# Patient Record
Sex: MALE | Race: WHITE | Employment: FULL TIME | URBAN - NONMETROPOLITAN AREA
[De-identification: names, ages, dates, MRNs, and addresses within clinical notes are randomized per-mention and may not be internally consistent; named-entity substitution may affect disease eponyms.]

---

## 2017-01-17 DIAGNOSIS — E34.9 TESTOSTERONE DEFICIENCY: ICD-10-CM

## 2017-01-17 DIAGNOSIS — N18.2 CHRONIC KIDNEY DISEASE, STAGE II (MILD): ICD-10-CM

## 2017-01-17 DIAGNOSIS — E78.5 HYPERLIPOPROTEINEMIA: ICD-10-CM

## 2017-01-17 DIAGNOSIS — E78.2 MIXED HYPERLIPIDEMIA: ICD-10-CM

## 2017-01-17 LAB
ALBUMIN SERPL-MCNC: 4.3 G/DL (ref 3.5–5.2)
ALP BLD-CCNC: 61 U/L (ref 40–130)
ALT SERPL-CCNC: 29 U/L (ref 5–41)
AST SERPL-CCNC: 24 U/L (ref 5–40)
BILIRUB SERPL-MCNC: 0.4 MG/DL (ref 0.2–1.2)
BILIRUBIN DIRECT: 0.1 MG/DL (ref 0–0.3)
BILIRUBIN, INDIRECT: 0.3 MG/DL (ref 0.1–1)
T4 FREE: 1.1 NG/ML (ref 0.9–1.7)
TOTAL PROTEIN: 7.3 G/DL (ref 6.6–8.7)
TSH SERPL DL<=0.05 MIU/L-ACNC: 2.65 UIU/ML (ref 0.27–4.2)

## 2017-01-19 LAB
PROSTATE SPECIFIC ANTIGEN FREE: 0.3 NG/ML
PROSTATE SPECIFIC ANTIGEN PERCENT FREE: 43 %
PROSTATE SPECIFIC ANTIGEN: 0.7 NG/ML (ref 0–4)
SEX HORMONE BINDING GLOBULIN: 27 NMOL/L (ref 11–80)
TESTOSTERONE FREE PERCENT: 2 % (ref 1.6–2.9)
TESTOSTERONE FREE, CALC: 59 PG/ML (ref 47–244)
TESTOSTERONE TOTAL-MALE: 298 NG/DL (ref 300–890)

## 2017-01-20 ENCOUNTER — TELEPHONE (OUTPATIENT)
Dept: FAMILY MEDICINE CLINIC | Age: 57
End: 2017-01-20

## 2017-01-21 ENCOUNTER — TELEPHONE (OUTPATIENT)
Dept: FAMILY MEDICINE CLINIC | Age: 57
End: 2017-01-21

## 2017-01-22 LAB
CHOLESTEROL, TOTAL: 239 MG/DL (ref 100–199)
HDL PARTICLE NO, NMR: 35 UMOL/L
HDL SIZE: 8.3 NM
HDLC SERPL-MCNC: 41 MG/DL
LARGE HDL PARTICLE, NMR: 1.7 UMOL/L
LARGE VLDL PARTICLE, NMR: 7.6 NMOL/L
LDL CHOLESTEROL: 162 MG/DL (ref 0–99)
LDL PARTICLE NUMBER, NMR: 2229 NMOL/L
LDL PARTICLE SIZE: 20.6 NM
LDL SIZE: 20.6 NM
LP INSULIN RESIST SCORE: 81
SMALL LDL PARTICLE, NMR: 1184 NMOL/L
SMALL LDL-P: 1184 NMOL/L
TRIGL SERPL-MCNC: 179 MG/DL (ref 0–149)
VLDL SIZE: 52.9 NM

## 2017-01-30 ENCOUNTER — OFFICE VISIT (OUTPATIENT)
Dept: FAMILY MEDICINE CLINIC | Age: 57
End: 2017-01-30
Payer: COMMERCIAL

## 2017-01-30 VITALS
RESPIRATION RATE: 20 BRPM | OXYGEN SATURATION: 97 % | HEIGHT: 71 IN | HEART RATE: 90 BPM | TEMPERATURE: 99.1 F | BODY MASS INDEX: 36.82 KG/M2 | SYSTOLIC BLOOD PRESSURE: 124 MMHG | DIASTOLIC BLOOD PRESSURE: 82 MMHG | WEIGHT: 263 LBS

## 2017-01-30 DIAGNOSIS — K21.9 GASTROESOPHAGEAL REFLUX DISEASE WITHOUT ESOPHAGITIS: ICD-10-CM

## 2017-01-30 DIAGNOSIS — E34.9 TESTOSTERONE DEFICIENCY: ICD-10-CM

## 2017-01-30 DIAGNOSIS — E78.5 HYPERLIPOPROTEINEMIA: ICD-10-CM

## 2017-01-30 DIAGNOSIS — M19.90 ARTHRITIS: ICD-10-CM

## 2017-01-30 DIAGNOSIS — N18.2 CHRONIC KIDNEY DISEASE, STAGE II (MILD): ICD-10-CM

## 2017-01-30 DIAGNOSIS — E78.2 MIXED HYPERLIPIDEMIA: ICD-10-CM

## 2017-01-30 DIAGNOSIS — J02.9 PHARYNGITIS, UNSPECIFIED ETIOLOGY: ICD-10-CM

## 2017-01-30 PROCEDURE — 99214 OFFICE O/P EST MOD 30 MIN: CPT | Performed by: FAMILY MEDICINE

## 2017-01-30 RX ORDER — CIPROFLOXACIN 500 MG/1
500 TABLET, FILM COATED ORAL 2 TIMES DAILY
Qty: 20 TABLET | Refills: 0 | Status: SHIPPED | OUTPATIENT
Start: 2017-01-30 | End: 2017-02-09

## 2017-01-30 RX ORDER — LOVASTATIN 20 MG/1
20 TABLET ORAL DAILY
Qty: 90 TABLET | Refills: 3 | Status: SHIPPED | OUTPATIENT
Start: 2017-01-30 | End: 2017-05-04 | Stop reason: SDUPTHER

## 2017-01-30 ASSESSMENT — ENCOUNTER SYMPTOMS
BLOOD IN STOOL: 0
CHEST TIGHTNESS: 0
EYES NEGATIVE: 1
ALLERGIC/IMMUNOLOGIC NEGATIVE: 1
WHEEZING: 0
VOMITING: 0
SHORTNESS OF BREATH: 1
COUGH: 0
NAUSEA: 0
CONSTIPATION: 0
DIARRHEA: 0

## 2017-01-30 ASSESSMENT — PATIENT HEALTH QUESTIONNAIRE - PHQ9
SUM OF ALL RESPONSES TO PHQ QUESTIONS 1-9: 0
SUM OF ALL RESPONSES TO PHQ9 QUESTIONS 1 & 2: 0
2. FEELING DOWN, DEPRESSED OR HOPELESS: 0
1. LITTLE INTEREST OR PLEASURE IN DOING THINGS: 0

## 2017-02-24 DIAGNOSIS — J01.11 ACUTE RECURRENT FRONTAL SINUSITIS: ICD-10-CM

## 2017-02-24 RX ORDER — MONTELUKAST SODIUM 10 MG/1
10 TABLET ORAL DAILY
Qty: 90 TABLET | Refills: 3 | Status: SHIPPED | OUTPATIENT
Start: 2017-02-24 | End: 2017-09-29

## 2017-04-28 DIAGNOSIS — E78.2 MIXED HYPERLIPIDEMIA: ICD-10-CM

## 2017-04-28 LAB
ALBUMIN SERPL-MCNC: 4.3 G/DL (ref 3.5–5.2)
ALP BLD-CCNC: 64 U/L (ref 40–130)
ALT SERPL-CCNC: 28 U/L (ref 5–41)
AST SERPL-CCNC: 22 U/L (ref 5–40)
BILIRUB SERPL-MCNC: 0.6 MG/DL (ref 0.2–1.2)
BILIRUBIN DIRECT: 0.1 MG/DL (ref 0–0.3)
BILIRUBIN, INDIRECT: 0.5 MG/DL (ref 0.1–1)
TOTAL PROTEIN: 7.1 G/DL (ref 6.6–8.7)

## 2017-05-03 LAB
CHOLESTEROL, TOTAL: 191 MG/DL (ref 100–199)
HDL PARTICLE NO, NMR: 28.7 UMOL/L
HDL SIZE: 8.3 NM
HDLC SERPL-MCNC: 37 MG/DL
LARGE HDL PARTICLE, NMR: <1.3 UMOL/L
LARGE VLDL PARTICLE, NMR: 4.2 NMOL/L
LDL CHOLESTEROL: 121 MG/DL (ref 0–99)
LDL PARTICLE NUMBER, NMR: 1672 NMOL/L
LDL PARTICLE SIZE: 20.3 NM
LDL SIZE: 20.3 NM
LP INSULIN RESIST SCORE: 75
SMALL LDL PARTICLE, NMR: 826 NMOL/L
SMALL LDL-P: 826 NMOL/L
TRIGL SERPL-MCNC: 163 MG/DL (ref 0–149)
VLDL SIZE: 48.8 NM

## 2017-05-04 ENCOUNTER — TELEPHONE (OUTPATIENT)
Dept: FAMILY MEDICINE CLINIC | Age: 57
End: 2017-05-04

## 2017-05-04 DIAGNOSIS — E78.2 MIXED HYPERLIPIDEMIA: ICD-10-CM

## 2017-05-04 DIAGNOSIS — E78.5 HYPERLIPOPROTEINEMIA: ICD-10-CM

## 2017-05-04 RX ORDER — LOVASTATIN 20 MG/1
20 TABLET ORAL DAILY
Qty: 90 TABLET | Refills: 3 | Status: SHIPPED | OUTPATIENT
Start: 2017-05-04 | End: 2017-09-21 | Stop reason: SDUPTHER

## 2017-05-12 ENCOUNTER — OFFICE VISIT (OUTPATIENT)
Dept: FAMILY MEDICINE CLINIC | Age: 57
End: 2017-05-12
Payer: COMMERCIAL

## 2017-05-12 VITALS
RESPIRATION RATE: 16 BRPM | OXYGEN SATURATION: 98 % | TEMPERATURE: 98.3 F | DIASTOLIC BLOOD PRESSURE: 88 MMHG | BODY MASS INDEX: 36.96 KG/M2 | HEART RATE: 72 BPM | SYSTOLIC BLOOD PRESSURE: 130 MMHG | WEIGHT: 265 LBS

## 2017-05-12 DIAGNOSIS — H66.001 ACUTE SUPPURATIVE OTITIS MEDIA OF RIGHT EAR WITHOUT SPONTANEOUS RUPTURE OF TYMPANIC MEMBRANE, RECURRENCE NOT SPECIFIED: Primary | ICD-10-CM

## 2017-05-12 PROCEDURE — 99213 OFFICE O/P EST LOW 20 MIN: CPT | Performed by: NURSE PRACTITIONER

## 2017-05-12 RX ORDER — CEFDINIR 300 MG/1
300 CAPSULE ORAL 2 TIMES DAILY
Qty: 20 CAPSULE | Refills: 0 | Status: SHIPPED | OUTPATIENT
Start: 2017-05-12 | End: 2017-05-22

## 2017-05-12 ASSESSMENT — ENCOUNTER SYMPTOMS
SORE THROAT: 0
COUGH: 0

## 2017-09-19 ENCOUNTER — TELEPHONE (OUTPATIENT)
Dept: CARDIOLOGY | Age: 57
End: 2017-09-19

## 2017-09-21 DIAGNOSIS — E78.2 MIXED HYPERLIPIDEMIA: ICD-10-CM

## 2017-09-21 DIAGNOSIS — E78.5 HYPERLIPOPROTEINEMIA: ICD-10-CM

## 2017-09-21 RX ORDER — LOVASTATIN 20 MG/1
20 TABLET ORAL DAILY
Qty: 90 TABLET | Refills: 3 | Status: SHIPPED | OUTPATIENT
Start: 2017-09-21 | End: 2017-09-29

## 2017-09-29 ENCOUNTER — HOSPITAL ENCOUNTER (OUTPATIENT)
Dept: PREADMISSION TESTING | Age: 57
Discharge: HOME | End: 2017-09-29
Payer: COMMERCIAL

## 2017-09-29 VITALS — HEIGHT: 71 IN | WEIGHT: 266 LBS | BODY MASS INDEX: 37.24 KG/M2

## 2017-09-29 LAB
ANION GAP SERPL CALCULATED.3IONS-SCNC: 10 MMOL/L (ref 7–19)
BASOPHILS ABSOLUTE: 0.1 K/UL (ref 0–0.2)
BASOPHILS RELATIVE PERCENT: 0.8 % (ref 0–1)
BUN BLDV-MCNC: 18 MG/DL (ref 6–20)
CALCIUM SERPL-MCNC: 9.3 MG/DL (ref 8.6–10)
CHLORIDE BLD-SCNC: 102 MMOL/L (ref 98–111)
CO2: 26 MMOL/L (ref 22–29)
CREAT SERPL-MCNC: 0.9 MG/DL (ref 0.5–1.2)
EOSINOPHILS ABSOLUTE: 0.2 K/UL (ref 0–0.6)
EOSINOPHILS RELATIVE PERCENT: 3.1 % (ref 0–5)
GFR NON-AFRICAN AMERICAN: >60
GLUCOSE BLD-MCNC: 95 MG/DL (ref 74–109)
HCT VFR BLD CALC: 47.9 % (ref 42–52)
HEMOGLOBIN: 15.4 G/DL (ref 14–18)
LYMPHOCYTES ABSOLUTE: 3.2 K/UL (ref 1.1–4.5)
LYMPHOCYTES RELATIVE PERCENT: 44.6 % (ref 20–40)
MCH RBC QN AUTO: 29 PG (ref 27–31)
MCHC RBC AUTO-ENTMCNC: 32.2 G/DL (ref 33–37)
MCV RBC AUTO: 90.2 FL (ref 80–94)
MONOCYTES ABSOLUTE: 0.7 K/UL (ref 0–0.9)
MONOCYTES RELATIVE PERCENT: 9.6 % (ref 0–10)
NEUTROPHILS ABSOLUTE: 3 K/UL (ref 1.5–7.5)
NEUTROPHILS RELATIVE PERCENT: 41.8 % (ref 50–65)
PDW BLD-RTO: 13.2 % (ref 11.5–14.5)
PLATELET # BLD: 222 K/UL (ref 130–400)
PMV BLD AUTO: 11.9 FL (ref 9.4–12.4)
POTASSIUM SERPL-SCNC: 5 MMOL/L (ref 3.5–5)
RBC # BLD: 5.31 M/UL (ref 4.7–6.1)
SODIUM BLD-SCNC: 138 MMOL/L (ref 136–145)
WBC # BLD: 7.2 K/UL (ref 4.8–10.8)

## 2017-09-29 PROCEDURE — 93005 ELECTROCARDIOGRAM TRACING: CPT

## 2017-09-29 PROCEDURE — 80048 BASIC METABOLIC PNL TOTAL CA: CPT

## 2017-09-29 PROCEDURE — 85025 COMPLETE CBC W/AUTO DIFF WBC: CPT

## 2017-09-29 RX ORDER — LOVASTATIN 20 MG/1
20 TABLET ORAL NIGHTLY
COMMUNITY
End: 2019-01-26 | Stop reason: ALTCHOICE

## 2017-09-30 LAB
EKG P AXIS: 41 DEGREES
EKG P-R INTERVAL: 194 MS
EKG Q-T INTERVAL: 370 MS
EKG QRS DURATION: 92 MS
EKG QTC CALCULATION (BAZETT): 387 MS
EKG T AXIS: 39 DEGREES

## 2017-10-05 ENCOUNTER — OFFICE VISIT (OUTPATIENT)
Dept: CARDIOLOGY | Age: 57
End: 2017-10-05
Payer: COMMERCIAL

## 2017-10-05 VITALS
WEIGHT: 266 LBS | HEART RATE: 76 BPM | DIASTOLIC BLOOD PRESSURE: 80 MMHG | SYSTOLIC BLOOD PRESSURE: 130 MMHG | HEIGHT: 71 IN | BODY MASS INDEX: 37.24 KG/M2

## 2017-10-05 DIAGNOSIS — E78.2 MIXED HYPERLIPIDEMIA: Primary | ICD-10-CM

## 2017-10-05 DIAGNOSIS — Z82.49 FAMILY HISTORY OF EARLY CAD: ICD-10-CM

## 2017-10-05 DIAGNOSIS — Z01.810 PREOPERATIVE CARDIOVASCULAR EXAMINATION: ICD-10-CM

## 2017-10-05 PROCEDURE — 99203 OFFICE O/P NEW LOW 30 MIN: CPT | Performed by: CLINICAL NURSE SPECIALIST

## 2017-10-05 RX ORDER — CELECOXIB 200 MG/1
200 CAPSULE ORAL PRN
COMMUNITY
End: 2018-11-26 | Stop reason: SDUPTHER

## 2017-10-05 NOTE — PROGRESS NOTES
shortness of breath. Cardiovascular  no exertional chest pain, orthopnea or PND. No sensation of arrhythmia or slow heart rate. No claudication or leg edema. Gastrointestinal  no abdominal swelling or pain. No blood in stool. No severe constipation, diarrhea, nausea, or vomiting. Genitourinary  no difficulty urinating, dysuria, frequency, or urgency. No flank pain or hematuria. Musculoskeletal  no back pain, gait disturbance, or myalgia. Has knee pain  Skin  no color change or rash. No pallor. No new surgical incision. Neurologic  no speech difficulty, facial asymmetry or lateralizing weakness. No seizures, presyncope, syncope, or significant dizziness. Hematologic  no easy bruising or excessive bleeding. Psychiatric  no severe anxiety or insomnia. No confusion. All other review of systems are negative. Objective  Vital Signs - /80  Pulse 76  Ht 5' 11\" (1.803 m)  Wt 266 lb (120.7 kg)  BMI 37.1 kg/m2  General - Nancy De Jesus is alert, cooperative, and pleasant. Well groomed. No acute distress. Body habitus is overweight. HEENT  The head is normocephalic. No circumoral cyanosis. Dentition is normal.   EYES -  No Xanthelasma, no arcus senilis, no conjunctival hemorrhages or discharge. Neck - Supple, without increased jugular venous pressures. No carotid bruits. No mass. Respiratory - Lungs are clear bilaterally. No wheezes or rales. Normal effort without use of accessory muscles. Cardiovascular  Heart has regular rhythm and rate. No murmurs, rubs or gallops. + pedal pulses and no varicosities. Abdominal -  Soft, nontender, nondistended. Bowel sounds are intact. Extremities - No clubbing, cyanosis, or  edema. Musculoskeletal - No musculoskeletal symptoms. No clubbing . No Osler's nodes. Gait normal .  No kyphosis or scoliosis. Skin -  no statis ulcers or dermatitis. Neurological - No focal signs are identified. Oriented to person, place and time. Psychiatric -  Appropriate affect and mood. Assessment:         1. Mixed hyperlipidemia  ECHO Stress Test   2. Preoperative cardiovascular examination  ECHO Stress Test   3. Family history of early CAD  ECHO Stress Test   Preoperative EKG was normal  Echo earlier this year showed no significant valvular disease with normal LV systolic function    Patient has risk factors for CAD including bother with heart attack at early age, hyperlipidemia that is well controlled. No overt symptoms however his exercise is limited due to his knees. We'll get stress test to evaluate any underlying myocardial ischemia. If stress test is okay Brent risk stratification letter to Dr. Julio Cesar Begum ECHO soon for surgical clearance- 10/6/17  Follow up in As needed  Call with any questions or concerns  Follow up with PCP for non cardiac problems  Report any new problems  Cardiovascular Fitness-Exercise as tolerated. Strive for 30 minutes of exercise most days of the week. Cardiac / Healthy Diet  Continue current medications as directed  Continue plan of treatment        I appreciate the opportunity of participating in the care and treatment of this patient.      Crystal Richmond, APRN      Cc:  Pablo Magaña MD

## 2017-10-05 NOTE — MR AVS SNAPSHOT
After Visit Summary             Naty Escobar   10/5/2017 9:15 AM   Office Visit    Description:  Male : 1960   Provider:  DINA Infante   Department:  [facility]              Your Follow-Up and Future Appointments         Below is a list of your follow-up and future appointments. This may not be a complete list as you may have made appointments directly with providers that we are not aware of or your providers may have made some for you. Please call your providers to confirm appointments. It is important to keep your appointments. Please bring your current insurance card, photo ID, co-pay, and all medication bottles to your appointment. If self-pay, payment is expected at the time of service. Your To-Do List     Future Appointments Provider Department Dept Phone    10/6/2017 9:15 AM [facility] ECHO ROOM 3 [facility] Echocardiography [phone]    Future Orders Complete By Expires    ECHO Stress Test [ECH07 Custom]  10/13/2017 (Approximate) 10/5/2018    Scheduling Instructions:    Stress Echocardiogram         Information from Your Visit        Department     Name Address Phone Fax    [facility] [address] [place] [address]. [address]      You Were Seen for:         Comments    Mixed hyperlipidemia   [272. 2. ICD-9-CM]         Vital Signs     Blood Pressure Pulse Height Weight Body Mass Index Smoking Status    130/80 76 5' 11\" (1.803 m) 266 lb (120.7 kg) 37.1 kg/m2 Never Smoker      Additional Information about your Body Mass Index (BMI)           Your BMI as listed above is considered obese (30 or more). BMI is an estimate of body fat, calculated from your height and weight. The higher your BMI, the greater your risk of heart disease, high blood pressure, type 2 diabetes, stroke, gallstones, arthritis, sleep apnea, and certain cancers. BMI is not perfect.   It may overestimate body fat in athletes and people who are more muscular. Even a small weight loss (between 5 and 10 percent of your current weight) by decreasing your calorie intake and becoming more physically active will help lower your risk of developing or worsening diseases associated with obesity. Learn more at: [employer]          Instructions        Stress ECHO soon for surgical clearance- soon   Follow up in As needed  Call with any questions or concerns  Follow up with PCP for non cardiac problems  Report any new problems  Cardiovascular Fitness-Exercise as tolerated. Strive for 30 minutes of exercise most days of the week. Cardiac / Healthy Diet  Continue current medications as directed  Continue plan of treatment    [place] at the [facility] and [address] located on the first floor of [place] [address] through hospital main entrance and turn immediately to your left. You are scheduled for your test on 10/6/17 @ 9:15 am please arrive @ 8:45 am to register at the CV. Stress Echocardiogram      This records the heart's activity during a cardiac stress test.  A stress echocardiogram is a very effective, noninvasive test that can help determine whether you have blockages in your coronary arteries. The exam takes approximately thirty minutes. To help ensure accurate results, patients should take the following steps in preparation for a stress echocardiogram:   Refrain from strenuous activity for 12 hours before the test.   Do not eat, drink, or smoke for two hours prior to the test.  Unless instructed otherwise by your physician, you should continue to take prescribed medications. Wear loose, comfortable clothing and walking shoes. If you need to change this appointment, please call [phone] to reschedule.               Medications and Orders      Your Current Medications Are

## 2017-10-05 NOTE — PATIENT INSTRUCTIONS
Stress ECHO soon for surgical clearance- soon   Follow up in As needed  Call with any questions or concerns  Follow up with PCP for non cardiac problems  Report any new problems  Cardiovascular Fitness-Exercise as tolerated. Strive for 30 minutes of exercise most days of the week. Cardiac / Healthy Diet  Continue current medications as directed  Continue plan of treatment    [place] at the [address] [place] [address] and [address] [facility] located on the first floor of [place] [address] through hospital main entrance and turn immediately to your left. You are scheduled for your test on 10/6/17 @ 9:15 am please arrive @ 8:45 am to register at the CV. Stress Echocardiogram      This records the heart's activity during a cardiac stress test.  A stress echocardiogram is a very effective, noninvasive test that can help determine whether you have blockages in your coronary arteries. The exam takes approximately thirty minutes. To help ensure accurate results, patients should take the following steps in preparation for a stress echocardiogram:   Refrain from strenuous activity for 12 hours before the test.   Do not eat, drink, or smoke for two hours prior to the test.  Unless instructed otherwise by your physician, you should continue to take prescribed medications. Wear loose, comfortable clothing and walking shoes. If you need to change this appointment, please call [phone] to reschedule.

## 2017-10-06 ENCOUNTER — HOSPITAL ENCOUNTER (OUTPATIENT)
Dept: NON INVASIVE DIAGNOSTICS | Age: 57
Discharge: HOME | End: 2017-10-06
Payer: COMMERCIAL

## 2017-10-06 VITALS
HEART RATE: 95 BPM | SYSTOLIC BLOOD PRESSURE: 122 MMHG | WEIGHT: 257.94 LBS | DIASTOLIC BLOOD PRESSURE: 79 MMHG | BODY MASS INDEX: 35.98 KG/M2

## 2017-10-06 DIAGNOSIS — E78.2 MIXED HYPERLIPIDEMIA: ICD-10-CM

## 2017-10-06 DIAGNOSIS — Z01.810 PRE-OPERATIVE CARDIOVASCULAR EXAMINATION: ICD-10-CM

## 2017-10-06 DIAGNOSIS — Z82.49 FAMILY HISTORY OF EARLY CAD: ICD-10-CM

## 2017-10-06 DIAGNOSIS — E78.2 MIXED HYPERLIPIDEMIA: Primary | ICD-10-CM

## 2017-10-06 DIAGNOSIS — Z82.49 FAMILY HISTORY OF ISCHEMIC HEART DISEASE: ICD-10-CM

## 2017-10-06 DIAGNOSIS — Z01.810 PREOPERATIVE CARDIOVASCULAR EXAMINATION: ICD-10-CM

## 2017-10-06 LAB
LV EF: 50 %
LVEF MODALITY: NORMAL

## 2017-10-06 PROCEDURE — 6360000002 HC RX W HCPCS: Performed by: INTERNAL MEDICINE

## 2017-10-06 PROCEDURE — 2580000003 HC RX 258: Performed by: INTERNAL MEDICINE

## 2017-10-06 PROCEDURE — C8928 TTE W OR W/O FOL W/CON,STRES: HCPCS

## 2017-10-06 PROCEDURE — 6360000004 HC RX CONTRAST MEDICATION: Performed by: INTERNAL MEDICINE

## 2017-10-06 RX ORDER — DOBUTAMINE HYDROCHLORIDE 200 MG/100ML
10 INJECTION INTRAVENOUS CONTINUOUS PRN
Status: ACTIVE | OUTPATIENT
Start: 2017-10-06 | End: 2017-10-07

## 2017-10-06 RX ORDER — SODIUM CHLORIDE 9 MG/ML
INJECTION, SOLUTION INTRAVENOUS
Status: COMPLETED | OUTPATIENT
Start: 2017-10-06 | End: 2017-10-06

## 2017-10-06 RX ORDER — SODIUM CHLORIDE 0.9 % (FLUSH) 0.9 %
10 SYRINGE (ML) INJECTION PRN
Status: ACTIVE | OUTPATIENT
Start: 2017-10-06 | End: 2017-10-07

## 2017-10-06 RX ADMIN — SODIUM CHLORIDE 250 ML: 9 INJECTION, SOLUTION INTRAVENOUS at 12:16

## 2017-10-06 RX ADMIN — DOBUTAMINE HYDROCHLORIDE 10 MCG/KG/MIN: 200 INJECTION INTRAVENOUS at 12:20

## 2017-10-06 RX ADMIN — Medication 10 ML: at 12:16

## 2017-10-06 RX ADMIN — PERFLUTREN 2.42 MG: 6.52 INJECTION, SUSPENSION INTRAVENOUS at 12:27

## 2017-10-13 ENCOUNTER — HOSPITAL ENCOUNTER (OUTPATIENT)
Dept: PREADMISSION TESTING | Age: 57
Discharge: HOME | End: 2017-10-13
Payer: COMMERCIAL

## 2017-10-18 ENCOUNTER — ANESTHESIA EVENT (OUTPATIENT)
Dept: OPERATING ROOM | Age: 57
End: 2017-10-18
Payer: COMMERCIAL

## 2017-10-18 ENCOUNTER — ANESTHESIA (OUTPATIENT)
Dept: OPERATING ROOM | Age: 57
End: 2017-10-18
Payer: COMMERCIAL

## 2017-10-18 ENCOUNTER — HOSPITAL ENCOUNTER (OUTPATIENT)
Age: 57
Setting detail: OUTPATIENT SURGERY
Discharge: HOME | End: 2017-10-18
Attending: ORTHOPAEDIC SURGERY | Admitting: ORTHOPAEDIC SURGERY
Payer: COMMERCIAL

## 2017-10-18 VITALS
TEMPERATURE: 97.4 F | OXYGEN SATURATION: 94 % | HEART RATE: 73 BPM | WEIGHT: 266 LBS | SYSTOLIC BLOOD PRESSURE: 115 MMHG | RESPIRATION RATE: 16 BRPM | BODY MASS INDEX: 37.24 KG/M2 | HEIGHT: 71 IN | DIASTOLIC BLOOD PRESSURE: 72 MMHG

## 2017-10-18 VITALS
DIASTOLIC BLOOD PRESSURE: 47 MMHG | OXYGEN SATURATION: 88 % | RESPIRATION RATE: 18 BRPM | SYSTOLIC BLOOD PRESSURE: 90 MMHG

## 2017-10-18 PROCEDURE — 6360000002 HC RX W HCPCS: Performed by: NURSE ANESTHETIST, CERTIFIED REGISTERED

## 2017-10-18 PROCEDURE — 7100000011 HC PHASE II RECOVERY - ADDTL 15 MIN: Performed by: ORTHOPAEDIC SURGERY

## 2017-10-18 PROCEDURE — 7100000001 HC PACU RECOVERY - ADDTL 15 MIN: Performed by: ORTHOPAEDIC SURGERY

## 2017-10-18 PROCEDURE — 6370000000 HC RX 637 (ALT 250 FOR IP): Performed by: ANESTHESIOLOGY

## 2017-10-18 PROCEDURE — 2580000003 HC RX 258: Performed by: ORTHOPAEDIC SURGERY

## 2017-10-18 PROCEDURE — 6360000002 HC RX W HCPCS: Performed by: ORTHOPAEDIC SURGERY

## 2017-10-18 PROCEDURE — 2500000003 HC RX 250 WO HCPCS: Performed by: NURSE ANESTHETIST, CERTIFIED REGISTERED

## 2017-10-18 PROCEDURE — 7100000000 HC PACU RECOVERY - FIRST 15 MIN: Performed by: ORTHOPAEDIC SURGERY

## 2017-10-18 PROCEDURE — 3700000001 HC ADD 15 MINUTES (ANESTHESIA): Performed by: ORTHOPAEDIC SURGERY

## 2017-10-18 PROCEDURE — 3600000014 HC SURGERY LEVEL 4 ADDTL 15MIN: Performed by: ORTHOPAEDIC SURGERY

## 2017-10-18 PROCEDURE — 2720000001 HC MISC SURG SUPPLY STERILE $51-500: Performed by: ORTHOPAEDIC SURGERY

## 2017-10-18 PROCEDURE — 7100000010 HC PHASE II RECOVERY - FIRST 15 MIN: Performed by: ORTHOPAEDIC SURGERY

## 2017-10-18 PROCEDURE — 2720000000 HC MISC SURG SUPPLY STERILE $0-50: Performed by: ORTHOPAEDIC SURGERY

## 2017-10-18 PROCEDURE — 3700000000 HC ANESTHESIA ATTENDED CARE: Performed by: ORTHOPAEDIC SURGERY

## 2017-10-18 PROCEDURE — 2500000003 HC RX 250 WO HCPCS: Performed by: ORTHOPAEDIC SURGERY

## 2017-10-18 PROCEDURE — 3600000004 HC SURGERY LEVEL 4 BASE: Performed by: ORTHOPAEDIC SURGERY

## 2017-10-18 RX ORDER — MORPHINE SULFATE 4 MG/ML
2 INJECTION, SOLUTION INTRAMUSCULAR; INTRAVENOUS EVERY 5 MIN PRN
Status: DISCONTINUED | OUTPATIENT
Start: 2017-10-18 | End: 2017-10-18 | Stop reason: HOSPADM

## 2017-10-18 RX ORDER — FENTANYL CITRATE 50 UG/ML
INJECTION, SOLUTION INTRAMUSCULAR; INTRAVENOUS PRN
Status: DISCONTINUED | OUTPATIENT
Start: 2017-10-18 | End: 2017-10-18 | Stop reason: SDUPTHER

## 2017-10-18 RX ORDER — MORPHINE SULFATE 4 MG/ML
4 INJECTION, SOLUTION INTRAMUSCULAR; INTRAVENOUS EVERY 5 MIN PRN
Status: DISCONTINUED | OUTPATIENT
Start: 2017-10-18 | End: 2017-10-18 | Stop reason: HOSPADM

## 2017-10-18 RX ORDER — METOCLOPRAMIDE HYDROCHLORIDE 5 MG/ML
10 INJECTION INTRAMUSCULAR; INTRAVENOUS ONCE
Status: DISCONTINUED | OUTPATIENT
Start: 2017-10-18 | End: 2017-10-18 | Stop reason: HOSPADM

## 2017-10-18 RX ORDER — ROPIVACAINE HYDROCHLORIDE 2 MG/ML
INJECTION, SOLUTION EPIDURAL; INFILTRATION; PERINEURAL PRN
Status: DISCONTINUED | OUTPATIENT
Start: 2017-10-18 | End: 2017-10-18 | Stop reason: HOSPADM

## 2017-10-18 RX ORDER — SODIUM CHLORIDE 0.9 % (FLUSH) 0.9 %
10 SYRINGE (ML) INJECTION EVERY 12 HOURS SCHEDULED
Status: DISCONTINUED | OUTPATIENT
Start: 2017-10-18 | End: 2017-10-18 | Stop reason: HOSPADM

## 2017-10-18 RX ORDER — SCOLOPAMINE TRANSDERMAL SYSTEM 1 MG/1
1 PATCH, EXTENDED RELEASE TRANSDERMAL
Status: DISCONTINUED | OUTPATIENT
Start: 2017-10-18 | End: 2017-10-18 | Stop reason: HOSPADM

## 2017-10-18 RX ORDER — PROPOFOL 10 MG/ML
INJECTION, EMULSION INTRAVENOUS PRN
Status: DISCONTINUED | OUTPATIENT
Start: 2017-10-18 | End: 2017-10-18 | Stop reason: SDUPTHER

## 2017-10-18 RX ORDER — KETOROLAC TROMETHAMINE 30 MG/ML
INJECTION, SOLUTION INTRAMUSCULAR; INTRAVENOUS PRN
Status: DISCONTINUED | OUTPATIENT
Start: 2017-10-18 | End: 2017-10-18 | Stop reason: SDUPTHER

## 2017-10-18 RX ORDER — LIDOCAINE HYDROCHLORIDE 10 MG/ML
INJECTION, SOLUTION INFILTRATION; PERINEURAL PRN
Status: DISCONTINUED | OUTPATIENT
Start: 2017-10-18 | End: 2017-10-18 | Stop reason: SDUPTHER

## 2017-10-18 RX ORDER — LIDOCAINE HYDROCHLORIDE 10 MG/ML
1 INJECTION, SOLUTION EPIDURAL; INFILTRATION; INTRACAUDAL; PERINEURAL
Status: DISCONTINUED | OUTPATIENT
Start: 2017-10-18 | End: 2017-10-18 | Stop reason: HOSPADM

## 2017-10-18 RX ORDER — DEXAMETHASONE SODIUM PHOSPHATE 10 MG/ML
INJECTION INTRAMUSCULAR; INTRAVENOUS PRN
Status: DISCONTINUED | OUTPATIENT
Start: 2017-10-18 | End: 2017-10-18 | Stop reason: SDUPTHER

## 2017-10-18 RX ORDER — PROMETHAZINE HYDROCHLORIDE 25 MG/ML
6.25 INJECTION, SOLUTION INTRAMUSCULAR; INTRAVENOUS
Status: DISCONTINUED | OUTPATIENT
Start: 2017-10-18 | End: 2017-10-18 | Stop reason: HOSPADM

## 2017-10-18 RX ORDER — SODIUM CHLORIDE, SODIUM LACTATE, POTASSIUM CHLORIDE, CALCIUM CHLORIDE 600; 310; 30; 20 MG/100ML; MG/100ML; MG/100ML; MG/100ML
INJECTION, SOLUTION INTRAVENOUS CONTINUOUS
Status: DISCONTINUED | OUTPATIENT
Start: 2017-10-18 | End: 2017-10-18 | Stop reason: HOSPADM

## 2017-10-18 RX ORDER — OXYCODONE HYDROCHLORIDE 5 MG/1
TABLET ORAL
Qty: 50 TABLET | Refills: 0 | Status: SHIPPED | OUTPATIENT
Start: 2017-10-18 | End: 2019-01-26

## 2017-10-18 RX ORDER — LIDOCAINE HYDROCHLORIDE 10 MG/ML
1 INJECTION, SOLUTION EPIDURAL; INFILTRATION; INTRACAUDAL; PERINEURAL ONCE
Status: COMPLETED | OUTPATIENT
Start: 2017-10-18 | End: 2017-10-18

## 2017-10-18 RX ORDER — HYDRALAZINE HYDROCHLORIDE 20 MG/ML
5 INJECTION INTRAMUSCULAR; INTRAVENOUS EVERY 10 MIN PRN
Status: DISCONTINUED | OUTPATIENT
Start: 2017-10-18 | End: 2017-10-18 | Stop reason: HOSPADM

## 2017-10-18 RX ORDER — ENALAPRILAT 2.5 MG/2ML
1.25 INJECTION INTRAVENOUS
Status: DISCONTINUED | OUTPATIENT
Start: 2017-10-18 | End: 2017-10-18 | Stop reason: HOSPADM

## 2017-10-18 RX ORDER — FENTANYL CITRATE 50 UG/ML
25 INJECTION, SOLUTION INTRAMUSCULAR; INTRAVENOUS
Status: DISCONTINUED | OUTPATIENT
Start: 2017-10-18 | End: 2017-10-18 | Stop reason: HOSPADM

## 2017-10-18 RX ORDER — DIPHENHYDRAMINE HYDROCHLORIDE 50 MG/ML
12.5 INJECTION INTRAMUSCULAR; INTRAVENOUS
Status: DISCONTINUED | OUTPATIENT
Start: 2017-10-18 | End: 2017-10-18 | Stop reason: HOSPADM

## 2017-10-18 RX ORDER — SODIUM CHLORIDE 0.9 % (FLUSH) 0.9 %
10 SYRINGE (ML) INJECTION PRN
Status: DISCONTINUED | OUTPATIENT
Start: 2017-10-18 | End: 2017-10-18 | Stop reason: HOSPADM

## 2017-10-18 RX ORDER — METOCLOPRAMIDE HYDROCHLORIDE 5 MG/ML
10 INJECTION INTRAMUSCULAR; INTRAVENOUS
Status: DISCONTINUED | OUTPATIENT
Start: 2017-10-18 | End: 2017-10-18 | Stop reason: HOSPADM

## 2017-10-18 RX ORDER — MIDAZOLAM HYDROCHLORIDE 1 MG/ML
2 INJECTION INTRAMUSCULAR; INTRAVENOUS
Status: DISCONTINUED | OUTPATIENT
Start: 2017-10-18 | End: 2017-10-18 | Stop reason: HOSPADM

## 2017-10-18 RX ORDER — PROPOFOL 10 MG/ML
INJECTION, EMULSION INTRAVENOUS CONTINUOUS PRN
Status: DISCONTINUED | OUTPATIENT
Start: 2017-10-18 | End: 2017-10-18 | Stop reason: SDUPTHER

## 2017-10-18 RX ORDER — ASPIRIN 81 MG/1
TABLET ORAL
Qty: 60 TABLET | Refills: 0 | Status: SHIPPED | OUTPATIENT
Start: 2017-10-18 | End: 2019-07-26

## 2017-10-18 RX ORDER — LABETALOL HYDROCHLORIDE 5 MG/ML
5 INJECTION, SOLUTION INTRAVENOUS EVERY 10 MIN PRN
Status: DISCONTINUED | OUTPATIENT
Start: 2017-10-18 | End: 2017-10-18 | Stop reason: HOSPADM

## 2017-10-18 RX ORDER — APREPITANT 40 MG/1
40 CAPSULE ORAL ONCE
Status: DISCONTINUED | OUTPATIENT
Start: 2017-10-18 | End: 2017-10-18 | Stop reason: HOSPADM

## 2017-10-18 RX ORDER — ONDANSETRON 2 MG/ML
INJECTION INTRAMUSCULAR; INTRAVENOUS PRN
Status: DISCONTINUED | OUTPATIENT
Start: 2017-10-18 | End: 2017-10-18 | Stop reason: SDUPTHER

## 2017-10-18 RX ORDER — ROCURONIUM BROMIDE 10 MG/ML
INJECTION, SOLUTION INTRAVENOUS PRN
Status: DISCONTINUED | OUTPATIENT
Start: 2017-10-18 | End: 2017-10-18 | Stop reason: SDUPTHER

## 2017-10-18 RX ADMIN — FENTANYL CITRATE 50 MCG: 50 INJECTION, SOLUTION INTRAMUSCULAR; INTRAVENOUS at 07:30

## 2017-10-18 RX ADMIN — PROPOFOL 200 MG: 10 INJECTION, EMULSION INTRAVENOUS at 07:14

## 2017-10-18 RX ADMIN — HYDROMORPHONE HYDROCHLORIDE 0.5 MG: 1 INJECTION, SOLUTION INTRAMUSCULAR; INTRAVENOUS; SUBCUTANEOUS at 08:12

## 2017-10-18 RX ADMIN — SODIUM CHLORIDE, SODIUM LACTATE, POTASSIUM CHLORIDE, AND CALCIUM CHLORIDE: 600; 310; 30; 20 INJECTION, SOLUTION INTRAVENOUS at 06:08

## 2017-10-18 RX ADMIN — KETOROLAC TROMETHAMINE 30 MG: 30 INJECTION, SOLUTION INTRAMUSCULAR at 07:22

## 2017-10-18 RX ADMIN — MIDAZOLAM HYDROCHLORIDE 2 MG: 1 INJECTION, SOLUTION INTRAMUSCULAR; INTRAVENOUS at 07:04

## 2017-10-18 RX ADMIN — SODIUM CHLORIDE, SODIUM LACTATE, POTASSIUM CHLORIDE, AND CALCIUM CHLORIDE: 600; 310; 30; 20 INJECTION, SOLUTION INTRAVENOUS at 07:09

## 2017-10-18 RX ADMIN — HYDROMORPHONE HYDROCHLORIDE 0.25 MG: 1 INJECTION, SOLUTION INTRAMUSCULAR; INTRAVENOUS; SUBCUTANEOUS at 08:02

## 2017-10-18 RX ADMIN — LIDOCAINE HYDROCHLORIDE 50 MG: 10 INJECTION, SOLUTION INFILTRATION; PERINEURAL at 07:14

## 2017-10-18 RX ADMIN — SODIUM CHLORIDE, SODIUM LACTATE, POTASSIUM CHLORIDE, AND CALCIUM CHLORIDE: 600; 310; 30; 20 INJECTION, SOLUTION INTRAVENOUS at 07:51

## 2017-10-18 RX ADMIN — FENTANYL CITRATE 50 MCG: 50 INJECTION, SOLUTION INTRAMUSCULAR; INTRAVENOUS at 07:11

## 2017-10-18 RX ADMIN — DEXAMETHASONE SODIUM PHOSPHATE 10 MG: 10 INJECTION INTRAMUSCULAR; INTRAVENOUS at 07:22

## 2017-10-18 RX ADMIN — LIDOCAINE HYDROCHLORIDE 1 ML: 10 INJECTION, SOLUTION EPIDURAL; INFILTRATION; INTRACAUDAL; PERINEURAL at 06:09

## 2017-10-18 RX ADMIN — CEFAZOLIN SODIUM 2 G: 2 SOLUTION INTRAVENOUS at 07:24

## 2017-10-18 RX ADMIN — PROPOFOL 120 MCG/KG/MIN: 10 INJECTION, EMULSION INTRAVENOUS at 07:10

## 2017-10-18 RX ADMIN — ONDANSETRON HYDROCHLORIDE 4 MG: 2 SOLUTION INTRAMUSCULAR; INTRAVENOUS at 07:22

## 2017-10-18 RX ADMIN — ROCURONIUM BROMIDE 50 MG: 10 INJECTION INTRAVENOUS at 07:14

## 2017-10-18 RX ADMIN — HYDROMORPHONE HYDROCHLORIDE 0.25 MG: 1 INJECTION, SOLUTION INTRAMUSCULAR; INTRAVENOUS; SUBCUTANEOUS at 07:36

## 2017-10-18 RX ADMIN — SUGAMMADEX 240 MG: 100 INJECTION, SOLUTION INTRAVENOUS at 08:03

## 2017-10-18 ASSESSMENT — PAIN - FUNCTIONAL ASSESSMENT: PAIN_FUNCTIONAL_ASSESSMENT: 0-10

## 2017-10-18 ASSESSMENT — PAIN DESCRIPTION - DESCRIPTORS: DESCRIPTORS: ACHING

## 2017-10-18 ASSESSMENT — PAIN SCALES - GENERAL: PAINLEVEL_OUTOF10: 0

## 2017-10-18 NOTE — ANESTHESIA PRE PROCEDURE
12.5 mg Intravenous Once PRN Aftab Collado, CRNA        promethazine (PHENERGAN) injection 6.25 mg  6.25 mg Intravenous Once PRN Aftab Venturaey, CRNA        metoclopramide (REGLAN) injection 10 mg  10 mg Intravenous Once PRN Rains Tobias, CRNA        labetalol (NORMODYNE;TRANDATE) injection 5 mg  5 mg Intravenous Q10 Min PRN Aftab Tobias, CRNA        hydrALAZINE (APRESOLINE) injection 5 mg  5 mg Intravenous Q10 Min PRN Aftab Tobias, CRNA        enalaprilat (VASOTEC) injection 1.25 mg  1.25 mg Intravenous Once PRN Rains Tobias, CRNA           Allergies:     Allergies   Allergen Reactions    Latex Rash    Imodium [Loperamide] Hives and Itching    Minocycline Rash       Problem List:    Patient Active Problem List   Diagnosis Code    Chronic kidney disease, stage II (mild) N18.2    GERD (gastroesophageal reflux disease) K21.9    Hyperlipidemia E78.5    Arthritis M19.90    Acute hypoxemic respiratory failure (HCC) J96.01    Acute hyponatremia E87.1    Hypochloremia E87.8    Leukocytosis (leucocytosis) D72.829    Hypoxia R09.02    Tachycardia R00.0    Testosterone deficiency E34.9    Mixed hyperlipidemia E78.2       Past Medical History:        Diagnosis Date    Congenital absence of one kidney     GERD (gastroesophageal reflux disease)     Hyperlipidemia     Osteoarthritis     PONV (postoperative nausea and vomiting)        Past Surgical History:        Procedure Laterality Date    CARPAL TUNNEL RELEASE Bilateral      X 2 ON EACH SIDE    KNEE ARTHROSCOPY Left 4/18/2016    KNEE ARTHROSCOPY WITH TIBIAL AND FEMORAL SUBCHONDROPLASTY performed by Lieutenant Yonis [facility] Bilateral     ROTATOR CUFF REPAIR Bilateral        Social History:    Social History   Substance Use Topics    Smoking status: Never Smoker    Smokeless tobacco: Never Used    Alcohol use 0.6 oz/week     1 Cans of beer per week      Comment: OCC                                Counseling given: Not Answered      Vital Signs (Current): There were no vitals filed for this visit. BP Readings from Last 3 Encounters:   10/18/17 129/88   10/06/17 122/79   10/05/17 130/80       NPO Status:                                                                                 BMI:   Wt Readings from Last 3 Encounters:   10/18/17 266 lb (120.7 kg)   10/06/17 257 lb 15 oz (117 kg)   10/05/17 266 lb (120.7 kg)     There is no height or weight on file to calculate BMI.    CBC:   Lab Results   Component Value Date    WBC 7.2 09/29/2017    RBC 5.31 09/29/2017    HGB 15.4 09/29/2017    HCT 47.9 09/29/2017    MCV 90.2 09/29/2017    RDW 13.2 09/29/2017     09/29/2017       CMP:   Lab Results   Component Value Date     09/29/2017     07/06/2011    K 5.0 09/29/2017    K 4.9 07/06/2011     09/29/2017     07/06/2011    CO2 26 09/29/2017    BUN 18 09/29/2017    CREATININE 0.9 09/29/2017    CREATININE 0.9 07/06/2011    LABGLOM >60 09/29/2017    GLUCOSE 95 09/29/2017    PROT 7.1 04/28/2017    CALCIUM 9.3 09/29/2017    BILITOT 0.6 04/28/2017    ALKPHOS 64 04/28/2017    AST 22 04/28/2017    ALT 28 04/28/2017       POC Tests: No results for input(s): POCGLU, POCNA, POCK, POCCL, POCBUN, POCHEMO, POCHCT in the last 72 hours. Coags:   Lab Results   Component Value Date    PROTIME 13.0 04/18/2016    INR 1.01 04/18/2016    APTT 23.7 04/18/2016       HCG (If Applicable): No results found for: PREGTESTUR, PREGSERUM, HCG, HCGQUANT     ABGs:   Lab Results   Component Value Date    PHART 7.390 04/18/2016    PO2ART 51.0 04/18/2016    ASC4JXZ 36.0 04/18/2016    GTX6OZU 21.8 04/18/2016    BEART -2.6 04/18/2016    F9HGTBKB 88.7 04/18/2016        Type & Screen (If Applicable):  No results found for: [facility]    Anesthesia Evaluation  Patient summary reviewed and Nursing notes reviewed   history of anesthetic complications: PONV.   Airway: Mallampati: I  TM distance: >3 FB   Neck ROM: full  Mouth opening: > = 3 FB Dental:          Pulmonary:negative ROS and normal exam        (-) COPD, asthma and sleep apnea                           Cardiovascular:negative ROS    (+) hyperlipidemia    (-) hypertension, past MI, CAD and CABG/stent    ECG reviewed               Beta Blocker:  Not on Beta Blocker         Neuro/Psych:   {neg ROS              GI/Hepatic/Renal: neg ROS  (+) GERD: well controlled,           Endo/Other:    (+) : arthritis: OA.    (-) no Type II DM, no Type I DM               Abdominal:           Vascular:                                        Anesthesia Plan      general     ASA 2     (Scop, emend    Decadron/Zofran Intraop)  Induction: intravenous. MIPS: Prophylactic antiemetics administered. Anesthetic plan and risks discussed with patient and spouse.                       Brandon Vincent MD   10/18/2017

## 2017-10-18 NOTE — ANESTHESIA POSTPROCEDURE EVALUATION
Department of Anesthesiology  Postprocedure Note    Patient: Brody Foster  MRN: [id number]  YOB: 1960  Date of evaluation: 10/18/2017  Time:  8:15 AM     Procedure Summary     Date:  10/18/17 Room / Location:  [facility] OR 09 / [facility] OR    Anesthesia Start:  0709 Anesthesia Stop:  0334    Procedure:  KNEE ARTHROSCOPY FSAI (Right Knee) Diagnosis:  (M25.561)    Surgeon:  Olvin Aaron MD Responsible Provider:  Rosendo Reno CRNA    Anesthesia Type:  general ASA Status:  2          Anesthesia Type: general    Geovanni Phase I:      Geovanni Phase II:      Last vitals: Reviewed and per EMR flowsheets. Anesthesia Post Evaluation    Patient location during evaluation: PACU  Patient participation: complete - patient participated  Level of consciousness: awake and alert  Pain score: 0  Airway patency: patent  Nausea & Vomiting: no nausea and no vomiting  Complications: no  Cardiovascular status: hemodynamically stable and blood pressure returned to baseline  Respiratory status: acceptable and nasal cannula  Hydration status: stable  Comments: Vital Signs Stable. Exchanging well. PACU RN received care.

## 2017-10-18 NOTE — OP NOTE
[facility] [address], [address] [facility]                               OPERATIVE REPORT    PATIENT NAME: Katy Moreno                        :             1960  MED REC NO:   [id number]                               ROOM:  ACCOUNT NO:   [de-identified]                            ADMISSION DATE:  10/18/2017  PROVIDER:     Deacon Schneider MD          DATE OF PROCEDURE:  10/18/2017    PREOPERATIVE DIAGNOSIS:  Right knee medial meniscal tear. POSTOPERATIVE DIAGNOSES:  1. Medial meniscal tear of the right knee. 2.  Grade 4 changes of the patella. 3.  Grade 2 changes of medial femoral condyle. PROCEDURES:  1. Right knee arthroscopy with excision of posterior horn medial  meniscus. 2.  Medial femoral condyle chondroplasty. 3.  Patellar chondroplasty. SURGEON:  Deacon Schneider MD    FIRST SURGICAL ASSISTANT:  None. ANESTHESIA:  General.    EBL:  Minimal.    FLUIDS:  1200 mL of crystalloid. TOURNIQUET TIME:  31 minutes. FINDINGS:  1.  Grade 4 changes of the patella. 2.  Tear of the posterior horn of the medial meniscus and entire  posterior horn was excised. 3.  Grade 2 changes of the medial femoral condyle. The rest of the  knee including the ACL and lateral compartment were very well  preserved. INDICATIONS:  A 78-year-old gentleman with progressive mechanical pain  on the right knee failing conservative care. Because of this, he  elected for the above procedure. OPERATIVE PROCEDURE:  After informed consent, he was given 2 gm Ancef,  underwent general anesthesia. Tourniquet was placed high around the  right upper thigh. Right leg was placed in an arthroscopic cruz,  left leg in a well-leg cruz. The right leg was prepped and draped  in usual sterile fashion. Leg was Esmarched. Tourniquet was inflated  to 300 mmHg. Anteromedial and anterolateral vertical-type portals  were established.   Diagnostic arthroscopy revealed the knee as long as possible before we do  anything more aggressive.         Ever Seymour MD    D: 10/18/2017 9:32:02       T: 10/18/2017 13:26:47     [email or web address]  Job#: [id number]     Doc#: [id number]

## 2017-10-19 RX ORDER — MIDAZOLAM HYDROCHLORIDE 1 MG/ML
INJECTION INTRAMUSCULAR; INTRAVENOUS PRN
Status: DISCONTINUED | OUTPATIENT
Start: 2017-10-18 | End: 2017-10-19 | Stop reason: SDUPTHER

## 2017-11-02 RX ORDER — OMEGA-3-ACID ETHYL ESTERS 1 G/1
1 CAPSULE, LIQUID FILLED ORAL DAILY
Qty: 90 CAPSULE | Refills: 3 | Status: SHIPPED | OUTPATIENT
Start: 2017-11-02 | End: 2018-08-29 | Stop reason: SDUPTHER

## 2018-02-08 ENCOUNTER — OFFICE VISIT (OUTPATIENT)
Dept: FAMILY MEDICINE CLINIC | Age: 58
End: 2018-02-08
Payer: COMMERCIAL

## 2018-02-08 VITALS
RESPIRATION RATE: 16 BRPM | HEIGHT: 71 IN | BODY MASS INDEX: 36.88 KG/M2 | OXYGEN SATURATION: 98 % | DIASTOLIC BLOOD PRESSURE: 88 MMHG | SYSTOLIC BLOOD PRESSURE: 126 MMHG | TEMPERATURE: 98.5 F | HEART RATE: 105 BPM | WEIGHT: 263.4 LBS

## 2018-02-08 DIAGNOSIS — B02.9 HERPES ZOSTER WITHOUT COMPLICATION: Primary | ICD-10-CM

## 2018-02-08 PROCEDURE — 99213 OFFICE O/P EST LOW 20 MIN: CPT | Performed by: FAMILY MEDICINE

## 2018-02-08 RX ORDER — ACYCLOVIR 800 MG/1
TABLET ORAL
COMMUNITY
Start: 2018-02-04 | End: 2019-01-26 | Stop reason: ALTCHOICE

## 2018-02-08 RX ORDER — GABAPENTIN 300 MG/1
300 CAPSULE ORAL 3 TIMES DAILY PRN
Qty: 90 CAPSULE | Refills: 0 | Status: SHIPPED | OUTPATIENT
Start: 2018-02-08 | End: 2019-01-26 | Stop reason: ALTCHOICE

## 2018-02-08 ASSESSMENT — ENCOUNTER SYMPTOMS
SORE THROAT: 0
SHORTNESS OF BREATH: 0
EYE DISCHARGE: 0
NAUSEA: 0
DIARRHEA: 0
EYE PAIN: 0
RHINORRHEA: 0
COUGH: 0
ABDOMINAL PAIN: 0
BACK PAIN: 0
WHEEZING: 0
CONSTIPATION: 0
VOMITING: 0

## 2018-02-08 ASSESSMENT — PATIENT HEALTH QUESTIONNAIRE - PHQ9
1. LITTLE INTEREST OR PLEASURE IN DOING THINGS: 0
2. FEELING DOWN, DEPRESSED OR HOPELESS: 0
SUM OF ALL RESPONSES TO PHQ9 QUESTIONS 1 & 2: 0
SUM OF ALL RESPONSES TO PHQ QUESTIONS 1-9: 0

## 2018-02-08 NOTE — PROGRESS NOTES
Alek Juarez is a 59 y.o. male who presents today for   Chief Complaint   Patient presents with    Herpes Zoster     He states he had went to the clinic in [place] on Saturday and was tested for the flu,it was negative but then went back on Sunday night and was told he has shingles-moderate to severe pain and redness. He states he also has an area on the left flank area that seems swollen and puffy but does not hurt its self       HPI  Patient presents today with a rash on his left side which he was diagnosed with shingles on Sunday and started on antiviral treatment on Monday. He reports he initially started having flulike symptoms but tested negative with the back pain and rash starting later. He states that the rash is painful in a burning stabbing aching type away. He has not noticed much of improvement so far with the use of acyclovir. He is here today As he is noticed some swelling around the rash area and is wanting to make sure that it is not something else going on and it does appear to be shingles. He is also reporting that the pain is fairly aggravating and he does not want to take the previously prescribed oxycodone for the pain because he doesn't like the way it makes him feel when he has to take it. He does report that his flulike symptoms have improved and is otherwise doing well. Review of Systems   Constitutional: Negative for activity change, appetite change, fatigue and fever. HENT: Negative for congestion, rhinorrhea and sore throat. Eyes: Negative for pain and discharge. Respiratory: Negative for cough, shortness of breath and wheezing. Cardiovascular: Negative for chest pain and palpitations. Gastrointestinal: Negative for abdominal pain, constipation, diarrhea, nausea and vomiting. Endocrine: Negative for cold intolerance and heat intolerance. Genitourinary: Negative for dysuria and hematuria. Musculoskeletal: Negative for back pain, gait problem and neck pain.    Skin: There is some swelling appreciated in the left side associated with the rash that appears to be superficial in nature. There is no sign of hernia or mass in the area. Psychiatric: He has a normal mood and affect. His behavior is normal. Thought content normal.   Nursing note and vitals reviewed. Assessment:    ICD-10-CM ICD-9-CM    1. Herpes zoster without complication P55.0 296.8 gabapentin (NEURONTIN) 300 MG capsule       Plan:  Discussed diagnosis, expected course, and proper use of medication  Discussed signs and symptoms requiring medical attention. All questions were answered and patient voiced understanding and agreement with plan as discussed. No orders of the defined types were placed in this encounter. Orders Placed This Encounter   Medications    gabapentin (NEURONTIN) 300 MG capsule     Sig: Take 1 capsule by mouth 3 times daily as needed (pain) for up to 30 days. Dispense:  90 capsule     Refill:  0     There are no discontinued medications. Patient Instructions   Patient given educational handouts and has had all questions answered. Patient voices understanding and agrees to plans along with risks and benefits of plan. Patient is instructed to continue prior meds, diet, and exercise plans as instructed. Patient agrees to follow up as instructed and sooner if needed. Patient agrees to go to ER if condition becomes emergent. Return if symptoms worsen or fail to improve, for next scheduled follow up with PCP.

## 2018-02-08 NOTE — PATIENT INSTRUCTIONS
Patient Education        Shingles: Care Instructions  Your Care Instructions    Shingles (herpes zoster) causes pain and a blistered rash. The rash can appear anywhere on the body but will be on only one side of the body, the left or right. It will be in a band, a strip, or a small area. The pain can be very severe. Shingles can also cause tingling or itching in the area of the rash. The blisters scab over after a few days and heal in 2 to 4 weeks. Medicines can help you feel better and may help prevent more serious problems caused by shingles. Shingles is caused by the same virus that causes chickenpox. When you have chickenpox, the virus gets into your nerve roots and stays there (becomes dormant) long after you get over the chickenpox. If the virus becomes active again, it can cause shingles. Follow-up care is a key part of your treatment and safety. Be sure to make and go to all appointments, and call your doctor if you are having problems. It's also a good idea to know your test results and keep a list of the medicines you take. How can you care for yourself at home? · Be safe with medicines. Take your medicines exactly as prescribed. Call your doctor if you think you are having a problem with your medicine. Antiviral medicine helps you get better faster. · Try not to scratch or pick at the blisters. They will crust over and fall off on their own if you leave them alone. · Put cool, wet cloths on the area to relieve pain and itching. You can also use calamine lotion. Try not to use so much lotion that it cakes and is hard to get off. · Put cornstarch or baking soda on the sores to help dry them out so they heal faster. · Do not use thick ointment, such as petroleum jelly, on the sores. This will keep them from drying and healing. · To help remove loose crusts, soak them in tap water. This can help decrease oozing, and dry and soothe the skin.   · Take an over-the-counter pain medicine, such as acetaminophen (Tylenol), ibuprofen (Advil, Motrin), or naproxen (Aleve). Read and follow all instructions on the label. · Avoid close contact with people until the blisters have healed. It is very important for you to avoid contact with anyone who has never had chickenpox or the chickenpox vaccine. Pregnant women, young babies, and anyone else who has a hard time fighting infection (such as someone with HIV, diabetes, or cancer) is especially at risk. When should you call for help? Call your doctor now or seek immediate medical care if:  ? · You have a new or higher fever. ? · You have a severe headache and a stiff neck. ? · You lose the ability to think clearly. ? · The rash spreads to your forehead, nose, eyes, or eyelids. ? · You have eye pain, or your vision gets worse. ? · You have new pain in your face, or you cannot move the muscles in your face. ? · Blisters spread to new parts of your body. ? Watch closely for changes in your health, and be sure to contact your doctor if:  ? · The rash has not healed after 2 to 4 weeks. ? · You still have pain after the rash has healed. Where can you learn more? Go to https://chpepiceweb.[employer] org and sign in to your [employer] account. Alana Hale in the [facility] box to learn more about \"Shingles: Care Instructions. \"     If you do not have an account, please click on the \"Sign Up Now\" link. Current as of: March 3, 2017  Content Version: 11.5  © 5321-7118 [employer], [employer]. Care instructions adapted under license by [facility] ([facility]). If you have questions about a medical condition or this instruction, always ask your healthcare professional. [place] [address] any warranty or liability for your use of this information.

## 2018-05-04 ENCOUNTER — TELEPHONE (OUTPATIENT)
Dept: FAMILY MEDICINE CLINIC | Age: 58
End: 2018-05-04

## 2018-05-04 DIAGNOSIS — R00.0 TACHYCARDIA: ICD-10-CM

## 2018-05-04 DIAGNOSIS — N18.2 CHRONIC KIDNEY DISEASE, STAGE II (MILD): ICD-10-CM

## 2018-05-04 DIAGNOSIS — D72.829 LEUKOCYTOSIS, UNSPECIFIED TYPE: ICD-10-CM

## 2018-05-04 DIAGNOSIS — E55.9 VITAMIN D DEFICIENCY: ICD-10-CM

## 2018-05-04 DIAGNOSIS — E34.9 TESTOSTERONE DEFICIENCY: ICD-10-CM

## 2018-05-04 DIAGNOSIS — E55.9 VITAMIN D DEFICIENCY: Primary | ICD-10-CM

## 2018-05-04 DIAGNOSIS — E78.2 MIXED HYPERLIPIDEMIA: ICD-10-CM

## 2018-05-04 LAB
ALBUMIN SERPL-MCNC: 4.2 G/DL (ref 3.5–5.2)
ALP BLD-CCNC: 63 U/L (ref 40–130)
ALT SERPL-CCNC: 33 U/L (ref 5–41)
ANION GAP SERPL CALCULATED.3IONS-SCNC: 16 MMOL/L (ref 7–19)
AST SERPL-CCNC: 25 U/L (ref 5–40)
BILIRUB SERPL-MCNC: 0.5 MG/DL (ref 0.2–1.2)
BILIRUBIN DIRECT: 0.1 MG/DL (ref 0–0.3)
BILIRUBIN URINE: NEGATIVE
BILIRUBIN, INDIRECT: 0.4 MG/DL (ref 0.1–1)
BLOOD, URINE: NEGATIVE
BUN BLDV-MCNC: 16 MG/DL (ref 6–20)
CALCIUM SERPL-MCNC: 9.4 MG/DL (ref 8.6–10)
CHLORIDE BLD-SCNC: 104 MMOL/L (ref 98–111)
CLARITY: CLEAR
CO2: 22 MMOL/L (ref 22–29)
COLOR: YELLOW
CREAT SERPL-MCNC: 0.7 MG/DL (ref 0.5–1.2)
GFR NON-AFRICAN AMERICAN: >60
GLUCOSE BLD-MCNC: 93 MG/DL (ref 74–109)
GLUCOSE URINE: NEGATIVE MG/DL
HCT VFR BLD CALC: 47.7 % (ref 42–52)
HEMOGLOBIN: 15.4 G/DL (ref 14–18)
KETONES, URINE: NEGATIVE MG/DL
LEUKOCYTE ESTERASE, URINE: NEGATIVE
MCH RBC QN AUTO: 28.7 PG (ref 27–31)
MCHC RBC AUTO-ENTMCNC: 32.3 G/DL (ref 33–37)
MCV RBC AUTO: 88.8 FL (ref 80–94)
NITRITE, URINE: NEGATIVE
PDW BLD-RTO: 13.1 % (ref 11.5–14.5)
PH UA: 6.5
PLATELET # BLD: 216 K/UL (ref 130–400)
PMV BLD AUTO: 11.7 FL (ref 9.4–12.4)
POTASSIUM SERPL-SCNC: 4.4 MMOL/L (ref 3.5–5)
PROTEIN UA: NEGATIVE MG/DL
RBC # BLD: 5.37 M/UL (ref 4.7–6.1)
SODIUM BLD-SCNC: 142 MMOL/L (ref 136–145)
SPECIFIC GRAVITY UA: 1.02
T4 FREE: 1.1 NG/DL (ref 0.9–1.7)
TOTAL PROTEIN: 6.9 G/DL (ref 6.6–8.7)
TSH SERPL DL<=0.05 MIU/L-ACNC: 1.58 UIU/ML (ref 0.27–4.2)
UROBILINOGEN, URINE: 0.2 E.U./DL
VITAMIN D 25-HYDROXY: 33 NG/ML
WBC # BLD: 6.1 K/UL (ref 4.8–10.8)

## 2018-05-05 LAB
SEX HORMONE BINDING GLOBULIN: 31 NMOL/L (ref 11–80)
TESTOSTERONE FREE-NONMALE: 72.9 PG/ML (ref 47–244)
TESTOSTERONE TOTAL: 351 NG/DL (ref 220–1000)

## 2018-05-07 ENCOUNTER — TELEPHONE (OUTPATIENT)
Dept: FAMILY MEDICINE CLINIC | Age: 58
End: 2018-05-07

## 2018-05-08 ENCOUNTER — TELEPHONE (OUTPATIENT)
Dept: FAMILY MEDICINE CLINIC | Age: 58
End: 2018-05-08

## 2018-05-10 LAB
CHOLESTEROL, TOTAL: 219 MG/DL
EER LIPOPROFILE BY NMR: ABNORMAL
HDL PARTICLE NO, NMR: ABNORMAL UMOL/L
HDL SIZE: ABNORMAL NM
HDLC SERPL-MCNC: 34 MG/DL (ref 40–59)
LARGE HDL PARTICLE, NMR: ABNORMAL UMOL/L
LARGE VLDL PARTICLE, NMR: ABNORMAL NMOL/L
LDL CHOLESTEROL: 162 MG/DL
LDL PARTICLE NUMBER, NMR: ABNORMAL NMOL/L
LDL PARTICLE SIZE: ABNORMAL NM
SMALL LDL PARTICLE, NMR: ABNORMAL NMOL/L
TRIGL SERPL-MCNC: 114 MG/DL (ref 30–149)
VLDL SIZE: ABNORMAL NM

## 2018-05-17 DIAGNOSIS — J01.11 ACUTE RECURRENT FRONTAL SINUSITIS: ICD-10-CM

## 2018-05-18 RX ORDER — MONTELUKAST SODIUM 10 MG/1
TABLET ORAL
Qty: 90 TABLET | Refills: 3 | Status: SHIPPED | OUTPATIENT
Start: 2018-05-18 | End: 2020-03-23 | Stop reason: SDUPTHER

## 2018-08-29 RX ORDER — OMEGA-3-ACID ETHYL ESTERS 1 G/1
1 CAPSULE, LIQUID FILLED ORAL DAILY
Qty: 90 CAPSULE | Refills: 3 | Status: SHIPPED | OUTPATIENT
Start: 2018-08-29 | End: 2019-08-19 | Stop reason: SDUPTHER

## 2018-11-19 ENCOUNTER — TELEPHONE (OUTPATIENT)
Dept: FAMILY MEDICINE CLINIC | Age: 58
End: 2018-11-19

## 2018-11-19 RX ORDER — BENZONATATE 200 MG/1
200 CAPSULE ORAL 3 TIMES DAILY PRN
Qty: 21 CAPSULE | Refills: 1 | Status: SHIPPED | OUTPATIENT
Start: 2018-11-19 | End: 2018-11-26

## 2018-11-26 RX ORDER — CELECOXIB 200 MG/1
200 CAPSULE ORAL DAILY
Qty: 90 CAPSULE | Refills: 3 | Status: SHIPPED | OUTPATIENT
Start: 2018-11-26 | End: 2021-05-05 | Stop reason: SDUPTHER

## 2019-01-26 ENCOUNTER — OFFICE VISIT (OUTPATIENT)
Dept: FAMILY MEDICINE CLINIC | Age: 59
End: 2019-01-26
Payer: COMMERCIAL

## 2019-01-26 VITALS
WEIGHT: 267 LBS | DIASTOLIC BLOOD PRESSURE: 80 MMHG | TEMPERATURE: 97.6 F | BODY MASS INDEX: 37.24 KG/M2 | HEART RATE: 74 BPM | SYSTOLIC BLOOD PRESSURE: 132 MMHG | OXYGEN SATURATION: 96 %

## 2019-01-26 DIAGNOSIS — J06.9 VIRAL URI: Primary | ICD-10-CM

## 2019-01-26 PROCEDURE — 99213 OFFICE O/P EST LOW 20 MIN: CPT | Performed by: NURSE PRACTITIONER

## 2019-01-26 PROCEDURE — 96372 THER/PROPH/DIAG INJ SC/IM: CPT | Performed by: NURSE PRACTITIONER

## 2019-01-26 RX ORDER — DEXAMETHASONE SODIUM PHOSPHATE 10 MG/ML
4 INJECTION INTRAMUSCULAR; INTRAVENOUS ONCE
Status: COMPLETED | OUTPATIENT
Start: 2019-01-26 | End: 2019-01-26

## 2019-01-26 RX ORDER — FLUTICASONE PROPIONATE 50 MCG
2 SPRAY, SUSPENSION (ML) NASAL DAILY
Qty: 1 BOTTLE | Refills: 5 | Status: SHIPPED | OUTPATIENT
Start: 2019-01-26 | End: 2021-05-05 | Stop reason: ALTCHOICE

## 2019-01-26 RX ORDER — TRIAMCINOLONE ACETONIDE 40 MG/ML
40 INJECTION, SUSPENSION INTRA-ARTICULAR; INTRAMUSCULAR ONCE
Status: COMPLETED | OUTPATIENT
Start: 2019-01-26 | End: 2019-01-26

## 2019-01-26 RX ADMIN — TRIAMCINOLONE ACETONIDE 40 MG: 40 INJECTION, SUSPENSION INTRA-ARTICULAR; INTRAMUSCULAR at 11:05

## 2019-01-26 RX ADMIN — DEXAMETHASONE SODIUM PHOSPHATE 4 MG: 10 INJECTION INTRAMUSCULAR; INTRAVENOUS at 11:05

## 2019-01-26 ASSESSMENT — ENCOUNTER SYMPTOMS
SINUS PRESSURE: 1
COUGH: 1

## 2019-03-20 DIAGNOSIS — E78.2 MIXED HYPERLIPIDEMIA: Primary | ICD-10-CM

## 2019-03-20 DIAGNOSIS — E55.9 VITAMIN D DEFICIENCY: ICD-10-CM

## 2019-03-20 DIAGNOSIS — E34.9 TESTOSTERONE DEFICIENCY: ICD-10-CM

## 2019-03-20 DIAGNOSIS — N18.2 CHRONIC KIDNEY DISEASE, STAGE II (MILD): ICD-10-CM

## 2019-03-22 DIAGNOSIS — N18.2 CHRONIC KIDNEY DISEASE, STAGE II (MILD): ICD-10-CM

## 2019-03-22 DIAGNOSIS — E34.9 TESTOSTERONE DEFICIENCY: ICD-10-CM

## 2019-03-22 DIAGNOSIS — E78.2 MIXED HYPERLIPIDEMIA: ICD-10-CM

## 2019-03-22 LAB
ALBUMIN SERPL-MCNC: 4.4 G/DL (ref 3.5–5.2)
ALP BLD-CCNC: 67 U/L (ref 40–130)
ALT SERPL-CCNC: 29 U/L (ref 5–41)
ANION GAP SERPL CALCULATED.3IONS-SCNC: 12 MMOL/L (ref 7–19)
AST SERPL-CCNC: 23 U/L (ref 5–40)
BILIRUB SERPL-MCNC: 0.4 MG/DL (ref 0.2–1.2)
BILIRUBIN DIRECT: 0.1 MG/DL (ref 0–0.3)
BILIRUBIN, INDIRECT: 0.3 MG/DL (ref 0.1–1)
BUN BLDV-MCNC: 16 MG/DL (ref 8–23)
CALCIUM SERPL-MCNC: 9.4 MG/DL (ref 8.8–10.2)
CHLORIDE BLD-SCNC: 103 MMOL/L (ref 98–111)
CHOLESTEROL, TOTAL: 254 MG/DL (ref 160–199)
CO2: 23 MMOL/L (ref 22–29)
CREAT SERPL-MCNC: 0.8 MG/DL (ref 0.5–1.2)
GFR NON-AFRICAN AMERICAN: >60
GLUCOSE BLD-MCNC: 97 MG/DL (ref 74–109)
HCT VFR BLD CALC: 49.3 % (ref 42–52)
HDLC SERPL-MCNC: 43 MG/DL (ref 55–121)
HEMOGLOBIN: 15.7 G/DL (ref 14–18)
LDL CHOLESTEROL CALCULATED: 192 MG/DL
MCH RBC QN AUTO: 28.8 PG (ref 27–31)
MCHC RBC AUTO-ENTMCNC: 31.8 G/DL (ref 33–37)
MCV RBC AUTO: 90.5 FL (ref 80–94)
PDW BLD-RTO: 13.2 % (ref 11.5–14.5)
PLATELET # BLD: 232 K/UL (ref 130–400)
PMV BLD AUTO: 11.2 FL (ref 9.4–12.4)
POTASSIUM SERPL-SCNC: 4.4 MMOL/L (ref 3.5–5)
RBC # BLD: 5.45 M/UL (ref 4.7–6.1)
SODIUM BLD-SCNC: 138 MMOL/L (ref 136–145)
T4 FREE: 1.2 NG/DL (ref 0.9–1.7)
TOTAL PROTEIN: 7.5 G/DL (ref 6.6–8.7)
TRIGL SERPL-MCNC: 97 MG/DL (ref 0–149)
TSH SERPL DL<=0.05 MIU/L-ACNC: 1.62 UIU/ML (ref 0.27–4.2)
WBC # BLD: 7.3 K/UL (ref 4.8–10.8)

## 2019-03-26 LAB
SEX HORMONE BINDING GLOBULIN: 37 NMOL/L (ref 11–80)
TESTOSTERONE FREE-NONMALE: 64.5 PG/ML (ref 47–244)
TESTOSTERONE TOTAL: 346 NG/DL (ref 220–1000)

## 2019-04-09 ENCOUNTER — TELEPHONE (OUTPATIENT)
Dept: FAMILY MEDICINE CLINIC | Age: 59
End: 2019-04-09

## 2019-07-26 ENCOUNTER — OFFICE VISIT (OUTPATIENT)
Dept: FAMILY MEDICINE CLINIC | Age: 59
End: 2019-07-26
Payer: COMMERCIAL

## 2019-07-26 VITALS
HEART RATE: 94 BPM | HEIGHT: 71 IN | SYSTOLIC BLOOD PRESSURE: 124 MMHG | BODY MASS INDEX: 36.85 KG/M2 | OXYGEN SATURATION: 97 % | WEIGHT: 263.2 LBS | TEMPERATURE: 97.7 F | DIASTOLIC BLOOD PRESSURE: 72 MMHG

## 2019-07-26 DIAGNOSIS — M19.90 ARTHRITIS: ICD-10-CM

## 2019-07-26 DIAGNOSIS — M16.11 PRIMARY OSTEOARTHRITIS OF RIGHT HIP: Primary | ICD-10-CM

## 2019-07-26 DIAGNOSIS — K21.9 GASTROESOPHAGEAL REFLUX DISEASE WITHOUT ESOPHAGITIS: ICD-10-CM

## 2019-07-26 DIAGNOSIS — E78.01 FAMILIAL HYPERCHOLESTEROLEMIA: ICD-10-CM

## 2019-07-26 DIAGNOSIS — E78.2 MIXED HYPERLIPIDEMIA: ICD-10-CM

## 2019-07-26 PROCEDURE — 96372 THER/PROPH/DIAG INJ SC/IM: CPT | Performed by: FAMILY MEDICINE

## 2019-07-26 PROCEDURE — 99214 OFFICE O/P EST MOD 30 MIN: CPT | Performed by: FAMILY MEDICINE

## 2019-07-26 RX ORDER — TRIAMCINOLONE ACETONIDE 40 MG/ML
80 INJECTION, SUSPENSION INTRA-ARTICULAR; INTRAMUSCULAR ONCE
Status: COMPLETED | OUTPATIENT
Start: 2019-07-26 | End: 2019-07-26

## 2019-07-26 RX ADMIN — TRIAMCINOLONE ACETONIDE 80 MG: 40 INJECTION, SUSPENSION INTRA-ARTICULAR; INTRAMUSCULAR at 09:42

## 2019-07-26 ASSESSMENT — ENCOUNTER SYMPTOMS
VOMITING: 0
SHORTNESS OF BREATH: 0
CHEST TIGHTNESS: 0
CONSTIPATION: 0
COUGH: 0
EYES NEGATIVE: 1
BLOOD IN STOOL: 0
WHEEZING: 0
NAUSEA: 0
DIARRHEA: 0

## 2019-07-26 NOTE — PROGRESS NOTES
form previously but presently cholesterol is the only one up. He has had hyperuricemia. He is maintained on Zyloprim at 100 mg p.o. daily. For chronic seasonal allergies, he does use Singulair 10 mg p.o. daily and Flonase nasal spray. He has had GERD in the past.  Presently he requires no medicine for this as the situation is stable as of this time. Review of Systems   Constitutional: Negative. HENT: Negative. Eyes: Negative. Respiratory: Negative for cough, chest tightness, shortness of breath and wheezing. Cardiovascular: Negative for chest pain, palpitations and leg swelling. Gastrointestinal: Negative for blood in stool, constipation, diarrhea, nausea and vomiting. Genitourinary: Negative for hematuria. Musculoskeletal: Positive for arthralgias. Skin: Negative. Neurological: Negative. Psychiatric/Behavioral: Negative. Objective:   Physical Exam   Constitutional: He is oriented to person, place, and time. He appears well-developed and well-nourished. HENT:   Head: Normocephalic and atraumatic. Right Ear: External ear normal.   Left Ear: External ear normal.   Nose: Nose normal.   Mouth/Throat: Oropharynx is clear and moist.   Eyes: Pupils are equal, round, and reactive to light. Conjunctivae and EOM are normal.   Neck: Normal range of motion. Neck supple. No thyromegaly present. Cardiovascular: Normal rate, regular rhythm, S1 normal, S2 normal, normal heart sounds, intact distal pulses and normal pulses. Exam reveals no gallop and no friction rub. No murmur heard. Pulmonary/Chest: Effort normal and breath sounds normal. No stridor. No apnea. No respiratory distress. He has no wheezes. He has no rales. Abdominal: Soft. Normal appearance. He exhibits no distension and no mass. There is no tenderness. There is no rebound and no guarding. Musculoskeletal: Normal range of motion. He exhibits no edema, tenderness or deformity.    Neurological: He is alert and

## 2019-08-19 RX ORDER — OMEGA-3-ACID ETHYL ESTERS 1 G/1
1 CAPSULE, LIQUID FILLED ORAL DAILY
Qty: 90 CAPSULE | Refills: 3 | Status: SHIPPED | OUTPATIENT
Start: 2019-08-19 | End: 2020-10-16 | Stop reason: SDUPTHER

## 2020-01-16 ENCOUNTER — NURSE ONLY (OUTPATIENT)
Dept: FAMILY MEDICINE CLINIC | Age: 60
End: 2020-01-16
Payer: COMMERCIAL

## 2020-01-16 ENCOUNTER — HOSPITAL ENCOUNTER (OUTPATIENT)
Dept: GENERAL RADIOLOGY | Age: 60
Discharge: HOME | End: 2020-01-16
Payer: COMMERCIAL

## 2020-01-16 LAB
ALBUMIN SERPL-MCNC: 4.6 G/DL (ref 3.5–5.2)
ALP BLD-CCNC: 74 U/L (ref 40–130)
ALT SERPL-CCNC: 33 U/L (ref 5–41)
ANION GAP SERPL CALCULATED.3IONS-SCNC: 17 MMOL/L (ref 7–19)
APTT: 27.5 SEC (ref 26–36.2)
AST SERPL-CCNC: 27 U/L (ref 5–40)
BASOPHILS ABSOLUTE: 0.1 K/UL (ref 0–0.2)
BASOPHILS RELATIVE PERCENT: 0.8 % (ref 0–1)
BILIRUB SERPL-MCNC: 0.4 MG/DL (ref 0.2–1.2)
BILIRUBIN URINE: NEGATIVE
BLOOD, URINE: NEGATIVE
BUN BLDV-MCNC: 13 MG/DL (ref 8–23)
CALCIUM SERPL-MCNC: 9.6 MG/DL (ref 8.8–10.2)
CHLORIDE BLD-SCNC: 103 MMOL/L (ref 98–111)
CLARITY: CLEAR
CO2: 21 MMOL/L (ref 22–29)
COLOR: YELLOW
CREAT SERPL-MCNC: 0.7 MG/DL (ref 0.5–1.2)
EOSINOPHILS ABSOLUTE: 0.1 K/UL (ref 0–0.6)
EOSINOPHILS RELATIVE PERCENT: 1.6 % (ref 0–5)
GFR NON-AFRICAN AMERICAN: >60
GLUCOSE BLD-MCNC: 92 MG/DL (ref 74–109)
GLUCOSE URINE: NEGATIVE MG/DL
HBA1C MFR BLD: 5.7 % (ref 4–6)
HCT VFR BLD CALC: 51.1 % (ref 42–52)
HEMOGLOBIN: 16.1 G/DL (ref 14–18)
IMMATURE GRANULOCYTES #: 0 K/UL
INR BLD: 1.05 (ref 0.88–1.18)
KETONES, URINE: NEGATIVE MG/DL
LEUKOCYTE ESTERASE, URINE: NEGATIVE
LYMPHOCYTES ABSOLUTE: 3 K/UL (ref 1.1–4.5)
LYMPHOCYTES RELATIVE PERCENT: 35.4 % (ref 20–40)
MCH RBC QN AUTO: 28.8 PG (ref 27–31)
MCHC RBC AUTO-ENTMCNC: 31.5 G/DL (ref 33–37)
MCV RBC AUTO: 91.3 FL (ref 80–94)
MONOCYTES ABSOLUTE: 0.8 K/UL (ref 0–0.9)
MONOCYTES RELATIVE PERCENT: 8.8 % (ref 0–10)
NEUTROPHILS ABSOLUTE: 4.5 K/UL (ref 1.5–7.5)
NEUTROPHILS RELATIVE PERCENT: 53 % (ref 50–65)
NITRITE, URINE: NEGATIVE
PDW BLD-RTO: 13.1 % (ref 11.5–14.5)
PH UA: 6 (ref 5–8)
PLATELET # BLD: 267 K/UL (ref 130–400)
PMV BLD AUTO: 11.3 FL (ref 9.4–12.4)
POTASSIUM SERPL-SCNC: 4.5 MMOL/L (ref 3.5–5)
PROTEIN UA: NEGATIVE MG/DL
PROTHROMBIN TIME: 13.1 SEC (ref 12–14.6)
RBC # BLD: 5.6 M/UL (ref 4.7–6.1)
SODIUM BLD-SCNC: 141 MMOL/L (ref 136–145)
SPECIFIC GRAVITY UA: 1.02 (ref 1–1.03)
TOTAL PROTEIN: 7.8 G/DL (ref 6.6–8.7)
URINE REFLEX TO CULTURE: NORMAL
UROBILINOGEN, URINE: 0.2 E.U./DL
WBC # BLD: 8.6 K/UL (ref 4.8–10.8)

## 2020-01-16 PROCEDURE — 71046 X-RAY EXAM CHEST 2 VIEWS: CPT

## 2020-01-16 PROCEDURE — 93000 ELECTROCARDIOGRAM COMPLETE: CPT | Performed by: FAMILY MEDICINE

## 2020-01-18 LAB — MRSA CULTURE ONLY: NORMAL

## 2020-01-25 ENCOUNTER — NURSE ONLY (OUTPATIENT)
Dept: FAMILY MEDICINE CLINIC | Age: 60
End: 2020-01-25
Payer: COMMERCIAL

## 2020-01-25 PROCEDURE — 90686 IIV4 VACC NO PRSV 0.5 ML IM: CPT | Performed by: NURSE PRACTITIONER

## 2020-01-25 PROCEDURE — 90471 IMMUNIZATION ADMIN: CPT | Performed by: NURSE PRACTITIONER

## 2020-03-23 RX ORDER — MONTELUKAST SODIUM 10 MG/1
TABLET ORAL
Qty: 90 TABLET | Refills: 3 | Status: SHIPPED | OUTPATIENT
Start: 2020-03-23 | End: 2021-05-05 | Stop reason: ALTCHOICE

## 2020-03-30 ENCOUNTER — TELEPHONE (OUTPATIENT)
Dept: FAMILY MEDICINE CLINIC | Age: 60
End: 2020-03-30

## 2020-03-30 ASSESSMENT — PATIENT HEALTH QUESTIONNAIRE - PHQ9
SUM OF ALL RESPONSES TO PHQ QUESTIONS 1-9: 0
2. FEELING DOWN, DEPRESSED OR HOPELESS: 0
1. LITTLE INTEREST OR PLEASURE IN DOING THINGS: 0
SUM OF ALL RESPONSES TO PHQ QUESTIONS 1-9: 0
SUM OF ALL RESPONSES TO PHQ9 QUESTIONS 1 & 2: 0

## 2020-06-20 ENCOUNTER — APPOINTMENT (OUTPATIENT)
Dept: CT IMAGING | Facility: HOSPITAL | Age: 60
End: 2020-06-20

## 2020-06-20 ENCOUNTER — APPOINTMENT (OUTPATIENT)
Dept: GENERAL RADIOLOGY | Facility: HOSPITAL | Age: 60
End: 2020-06-20

## 2020-06-20 ENCOUNTER — HOSPITAL ENCOUNTER (EMERGENCY)
Facility: HOSPITAL | Age: 60
Discharge: HOME | End: 2020-06-20
Attending: EMERGENCY MEDICINE | Admitting: EMERGENCY MEDICINE

## 2020-06-20 VITALS
RESPIRATION RATE: 14 BRPM | HEIGHT: 71 IN | OXYGEN SATURATION: 98 % | WEIGHT: 252 LBS | SYSTOLIC BLOOD PRESSURE: 141 MMHG | DIASTOLIC BLOOD PRESSURE: 87 MMHG | TEMPERATURE: 98 F | BODY MASS INDEX: 35.28 KG/M2 | HEART RATE: 69 BPM

## 2020-06-20 DIAGNOSIS — R40.4 TRANSIENT ALTERATION OF AWARENESS: Primary | ICD-10-CM

## 2020-06-20 LAB
ABO GROUP BLD: NORMAL
ALBUMIN SERPL-MCNC: 4.4 G/DL (ref 3.5–5.2)
ALBUMIN/GLOB SERPL: 1.5 G/DL
ALP SERPL-CCNC: 76 U/L (ref 39–117)
ALT SERPL W P-5'-P-CCNC: 19 U/L (ref 1–41)
ANION GAP SERPL CALCULATED.3IONS-SCNC: 12 MMOL/L (ref 5–15)
APTT PPP: 27.2 SECONDS (ref 24.1–35)
AST SERPL-CCNC: 24 U/L (ref 1–40)
BASOPHILS # BLD AUTO: 0.08 10*3/MM3 (ref 0–0.2)
BASOPHILS NFR BLD AUTO: 0.9 % (ref 0–1.5)
BILIRUB SERPL-MCNC: 0.4 MG/DL (ref 0.2–1.2)
BLD GP AB SCN SERPL QL: NEGATIVE
BUN BLD-MCNC: 19 MG/DL (ref 8–23)
BUN/CREAT SERPL: 23.5 (ref 7–25)
CALCIUM SPEC-SCNC: 9.5 MG/DL (ref 8.6–10.5)
CHLORIDE SERPL-SCNC: 108 MMOL/L (ref 98–107)
CO2 SERPL-SCNC: 23 MMOL/L (ref 22–29)
CREAT BLD-MCNC: 0.81 MG/DL (ref 0.76–1.27)
DEPRECATED RDW RBC AUTO: 43.1 FL (ref 37–54)
EOSINOPHIL # BLD AUTO: 0.23 10*3/MM3 (ref 0–0.4)
EOSINOPHIL NFR BLD AUTO: 2.5 % (ref 0.3–6.2)
ERYTHROCYTE [DISTWIDTH] IN BLOOD BY AUTOMATED COUNT: 14.2 % (ref 12.3–15.4)
GFR SERPL CREATININE-BSD FRML MDRD: 97 ML/MIN/1.73
GLOBULIN UR ELPH-MCNC: 3 GM/DL
GLUCOSE BLD-MCNC: 98 MG/DL (ref 65–99)
GLUCOSE BLDC GLUCOMTR-MCNC: 91 MG/DL (ref 70–130)
HCT VFR BLD AUTO: 47 % (ref 37.5–51)
HGB BLD-MCNC: 15.6 G/DL (ref 13–17.7)
HOLD SPECIMEN: NORMAL
HOLD SPECIMEN: NORMAL
IMM GRANULOCYTES # BLD AUTO: 0.02 10*3/MM3 (ref 0–0.05)
IMM GRANULOCYTES NFR BLD AUTO: 0.2 % (ref 0–0.5)
INR PPP: 1.02 (ref 0.91–1.09)
LYMPHOCYTES # BLD AUTO: 3.34 10*3/MM3 (ref 0.7–3.1)
LYMPHOCYTES NFR BLD AUTO: 36.1 % (ref 19.6–45.3)
MCH RBC QN AUTO: 27.8 PG (ref 26.6–33)
MCHC RBC AUTO-ENTMCNC: 33.2 G/DL (ref 31.5–35.7)
MCV RBC AUTO: 83.6 FL (ref 79–97)
MONOCYTES # BLD AUTO: 0.87 10*3/MM3 (ref 0.1–0.9)
MONOCYTES NFR BLD AUTO: 9.4 % (ref 5–12)
NEUTROPHILS # BLD AUTO: 4.7 10*3/MM3 (ref 1.7–7)
NEUTROPHILS NFR BLD AUTO: 50.9 % (ref 42.7–76)
NRBC BLD AUTO-RTO: 0 /100 WBC (ref 0–0.2)
PLATELET # BLD AUTO: 243 10*3/MM3 (ref 140–450)
PMV BLD AUTO: 10.9 FL (ref 6–12)
POTASSIUM BLD-SCNC: 4.3 MMOL/L (ref 3.5–5.2)
PROT SERPL-MCNC: 7.4 G/DL (ref 6–8.5)
PROTHROMBIN TIME: 13 SECONDS (ref 11.9–14.6)
RBC # BLD AUTO: 5.62 10*6/MM3 (ref 4.14–5.8)
RH BLD: POSITIVE
SODIUM BLD-SCNC: 143 MMOL/L (ref 136–145)
T&S EXPIRATION DATE: NORMAL
TROPONIN T SERPL-MCNC: <0.01 NG/ML (ref 0–0.03)
WBC NRBC COR # BLD: 9.24 10*3/MM3 (ref 3.4–10.8)
WHOLE BLOOD HOLD SPECIMEN: NORMAL
WHOLE BLOOD HOLD SPECIMEN: NORMAL

## 2020-06-20 PROCEDURE — 93005 ELECTROCARDIOGRAM TRACING: CPT

## 2020-06-20 PROCEDURE — 93010 ELECTROCARDIOGRAM REPORT: CPT | Performed by: INTERNAL MEDICINE

## 2020-06-20 PROCEDURE — 71045 X-RAY EXAM CHEST 1 VIEW: CPT

## 2020-06-20 PROCEDURE — 82962 GLUCOSE BLOOD TEST: CPT

## 2020-06-20 PROCEDURE — 85610 PROTHROMBIN TIME: CPT | Performed by: EMERGENCY MEDICINE

## 2020-06-20 PROCEDURE — 84484 ASSAY OF TROPONIN QUANT: CPT | Performed by: EMERGENCY MEDICINE

## 2020-06-20 PROCEDURE — 70450 CT HEAD/BRAIN W/O DYE: CPT

## 2020-06-20 PROCEDURE — 36415 COLL VENOUS BLD VENIPUNCTURE: CPT

## 2020-06-20 PROCEDURE — 80053 COMPREHEN METABOLIC PANEL: CPT | Performed by: EMERGENCY MEDICINE

## 2020-06-20 PROCEDURE — 99282 EMERGENCY DEPT VISIT SF MDM: CPT

## 2020-06-20 PROCEDURE — 86850 RBC ANTIBODY SCREEN: CPT | Performed by: EMERGENCY MEDICINE

## 2020-06-20 PROCEDURE — 85025 COMPLETE CBC W/AUTO DIFF WBC: CPT | Performed by: EMERGENCY MEDICINE

## 2020-06-20 PROCEDURE — 93005 ELECTROCARDIOGRAM TRACING: CPT | Performed by: EMERGENCY MEDICINE

## 2020-06-20 PROCEDURE — 85730 THROMBOPLASTIN TIME PARTIAL: CPT | Performed by: EMERGENCY MEDICINE

## 2020-06-20 PROCEDURE — 86900 BLOOD TYPING SEROLOGIC ABO: CPT | Performed by: EMERGENCY MEDICINE

## 2020-06-20 PROCEDURE — 86901 BLOOD TYPING SEROLOGIC RH(D): CPT | Performed by: EMERGENCY MEDICINE

## 2020-06-20 RX ORDER — SODIUM CHLORIDE 0.9 % (FLUSH) 0.9 %
10 SYRINGE (ML) INJECTION AS NEEDED
Status: DISCONTINUED | OUTPATIENT
Start: 2020-06-20 | End: 2020-06-20 | Stop reason: HOSPADM

## 2020-06-20 NOTE — ED PROVIDER NOTES
Subjective   Patient complains of an episode today a couple of hours ago that hit him with a feeling of some type of possible pressure behind his eyes and then he gets a little confused and cannot remember anything.  It lasted about an hour to an hour and a half and is gradually resolved.  This is actually the fourth episode like this he has had happened.  He thinks the first 1 was several years ago but have one last year and again one last week while at work.  They all hit him the same way where he feels little pressure behind his eyes and then gradually his confusion can remember things.  It tends to last 1 to 2 hours each time and then gradually resolves.  He is never had it evaluated before.  He decided this time since that it happened twice in the past week that he should get checked out so he came in to be checked.  Right now he feels back to normal.  He denies any recent head trauma or injuries or other significant illnesses.  There are no unilateral signs or focal weaknesses when this occurs.      History provided by:  Patient   used: No    Altered Mental Status   Presenting symptoms: confusion and memory loss    Severity:  Moderate  Most recent episode:  Today  Episode history:  Single  Duration:  1 hour  Timing:  Constant  Progression:  Resolved  Chronicity:  Recurrent  Context: not alcohol use, not dementia, not drug use, not head injury, not homeless, taking medications as prescribed, not nursing home resident, not recent change in medication, not recent illness and not recent infection    Associated symptoms: no abdominal pain, normal movement, no agitation, no bladder incontinence, no decreased appetite, no depression, no difficulty breathing, no eye deviation, no fever, no hallucinations, no headaches, no light-headedness, no nausea, no palpitations, no rash, no seizures, no slurred speech, no suicidal behavior, no visual change, no vomiting and no weakness        Review of Systems    Constitutional: Negative.  Negative for decreased appetite and fever.   HENT: Negative.    Respiratory: Negative.    Cardiovascular: Negative.  Negative for palpitations.   Gastrointestinal: Negative.  Negative for abdominal pain, nausea and vomiting.   Genitourinary: Negative.  Negative for bladder incontinence.   Musculoskeletal: Negative.    Skin: Negative.  Negative for rash.   Neurological: Negative.  Negative for seizures, weakness, light-headedness and headaches.   Hematological: Negative.    Psychiatric/Behavioral: Positive for confusion and memory loss. Negative for agitation and hallucinations.   All other systems reviewed and are negative.      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, Benign - s/p bx   • Laryngitis    • Laryngopharyngeal reflux    • Voice disturbance        Allergies   Allergen Reactions   • Latex Other (See Comments)     SKIN IRRITATION        Past Surgical History:   Procedure Laterality Date   • CARPAL TUNNEL RELEASE     • ENDOSCOPY  12/13/2013   • LARYNGOSCOPY  11/07/2013    Direct   • MENISCECTOMY     • SINUS SURGERY         Family History   Problem Relation Age of Onset   • Cancer Mother         Breast Cancer   • Heart disease Father        Social History     Socioeconomic History   • Marital status:      Spouse name: Not on file   • Number of children: Not on file   • Years of education: Not on file   • Highest education level: Not on file   Tobacco Use   • Smoking status: Never Smoker   Substance and Sexual Activity   • Alcohol use: Yes     Comment: Current some day   • Drug use: Defer       Prior to Admission medications    Medication Sig Start Date End Date Taking? Authorizing Provider   celecoxib (CeleBREX) 200 MG capsule  9/10/16   Ernestine Rock MD   fenofibrate (TRICOR) 145 MG tablet TAKE ONE TABLET BY MOUTH DAILY 8/26/16   Ernestine Rock MD   NIACOR 500 MG tablet  TAKE TWO TABLETS BY MOUTH TWICE DAILY 8/26/16   Provider, MD Ernestine   omega-3 acid ethyl esters (LOVAZA) 1 G capsule TAKE TWO CAPSULES BY MOUTH TWICE DAILY 8/26/16   ProviderErnestine MD   pantoprazole (PROTONIX) 40 MG EC tablet Take 1 tablet by mouth daily. Delayed Release (DR/EC)  Take 1 tablet (40 mg) by oral route once daily for 90 days 9/23/16   Mindi Cornejo APRN       Medications - No data to display    Vitals:    06/20/20 1649   BP: 141/87   Pulse: 69   Resp: 14   Temp: 98 °F (36.7 °C)   SpO2: 98%         Objective   Physical Exam   Constitutional: He is oriented to person, place, and time. He appears well-developed and well-nourished.   HENT:   Head: Normocephalic and atraumatic.   Eyes: Pupils are equal, round, and reactive to light. EOM are normal.   Cardiovascular: Normal rate and regular rhythm.   Pulmonary/Chest: Effort normal and breath sounds normal.   Musculoskeletal: Normal range of motion.   Neurological: He is alert and oriented to person, place, and time. He displays normal reflexes. No cranial nerve deficit or sensory deficit. He exhibits normal muscle tone. Coordination normal.   Skin: Skin is warm and dry.   Psychiatric: He has a normal mood and affect. His behavior is normal.   Nursing note and vitals reviewed.      Procedures         Lab Results (last 24 hours)     Procedure Component Value Units Date/Time    POC Glucose Once [[id number]]  (Normal) Collected:  06/20/20 1443    Specimen:  Blood Updated:  06/20/20 1454     Glucose 91 mg/dL      [id number]       CBC & Differential [[id number]] Collected:  06/20/20 1447    Specimen:  Blood Updated:  06/20/20 1510    Narrative:       The following orders were created for panel order CBC & Differential.  Procedure                               Abnormality         Status                     ---------                               -----------         ------                     CBC Auto  Differential[[id number]]        Abnormal            Final result                 Please view results for these tests on the individual orders.    Comprehensive Metabolic Panel [[id number]]  (Abnormal) Collected:  06/20/20 1447    Specimen:  Blood Updated:  06/20/20 1527     Glucose 98 mg/dL      BUN 19 mg/dL      Creatinine 0.81 mg/dL      Sodium 143 mmol/L      Potassium 4.3 mmol/L      Chloride 108 mmol/L      CO2 23.0 mmol/L      Calcium 9.5 mg/dL      Total Protein 7.4 g/dL      Albumin 4.40 g/dL      ALT (SGPT) 19 U/L      AST (SGOT) 24 U/L      Alkaline Phosphatase 76 U/L      Total Bilirubin 0.4 mg/dL      eGFR Non African Amer 97 mL/min/1.73      Globulin 3.0 gm/dL      A/G Ratio 1.5 g/dL      BUN/Creatinine Ratio 23.5     Anion Gap 12.0 mmol/L     Narrative:       GFR Normal >60  Chronic Kidney Disease <60  Kidney Failure <15      Protime-INR [[id number]]  (Normal) Collected:  06/20/20 1447    Specimen:  Blood Updated:  06/20/20 1517     Protime 13.0 Seconds      INR 1.02    aPTT [[id number]]  (Normal) Collected:  06/20/20 1447    Specimen:  Blood Updated:  06/20/20 1517     PTT 27.2 seconds     Troponin [[id number]]  (Normal) Collected:  06/20/20 1447    Specimen:  Blood Updated:  06/20/20 1525     Troponin T <0.010 ng/mL     Narrative:       Troponin T Reference Range:  <= 0.03 ng/mL-   Negative for AMI  >0.03 ng/mL-     Abnormal for myocardial necrosis.  Clinicians would have to utilize clinical acumen, EKG, Troponin and serial changes to determine if it is an Acute Myocardial Infarction or myocardial injury due to an underlying chronic condition.       Results may be falsely decreased if patient taking Biotin.      CBC Auto Differential [[id number]]  (Abnormal) Collected:  06/20/20 1447    Specimen:  Blood Updated:  06/20/20 1510     WBC 9.24 10*3/mm3      RBC 5.62 10*6/mm3      Hemoglobin 15.6 g/dL      Hematocrit 47.0 %      MCV 83.6 fL      MCH 27.8 pg      MCHC 33.2 g/dL      RDW 14.2 %      RDW-SD 43.1  fl      MPV 10.9 fL      Platelets 243 10*3/mm3      Neutrophil % 50.9 %      Lymphocyte % 36.1 %      Monocyte % 9.4 %      Eosinophil % 2.5 %      Basophil % 0.9 %      Immature Grans % 0.2 %      Neutrophils, Absolute 4.70 10*3/mm3      Lymphocytes, Absolute 3.34 10*3/mm3      Monocytes, Absolute 0.87 10*3/mm3      Eosinophils, Absolute 0.23 10*3/mm3      Basophils, Absolute 0.08 10*3/mm3      Immature Grans, Absolute 0.02 10*3/mm3      nRBC 0.0 /100 WBC           CT Head Without Contrast Stroke Protocol   Final Result   No acute intracranial abnormality.           This report was finalized on 06/20/2020 15:52 by Dr. Stanford Hanson MD.      XR Chest 1 View   Final Result   No acute cardiopulmonary abnormality.   This report was finalized on 06/20/2020 15:38 by Dr. Stanford Hanson MD.          ED Course  ED Course as of Jun 20 1859   Sat Jun 20, 2020 1859 I talked with the patient and his wife at length.  I told him all of his testing here was fine.  It really did not sound like a TIA to me because of the nature of what he is describing but could be some type of subtle seizure disorder or something else.  I recommended he get further testing by his family physician such as an MRI or EEG.  However right now things seems to be okay.  He is discharged in stable condition.    [TR]      ED Course User Index  [TR] Pk Ferrari Jr., MD          MDM  Number of Diagnoses or Management Options  Transient alteration of awareness: new and requires workup     Amount and/or Complexity of Data Reviewed  Clinical lab tests: ordered and reviewed  Tests in the radiology section of CPT®: ordered and reviewed  Tests in the medicine section of CPT®: reviewed and ordered    Risk of Complications, Morbidity, and/or Mortality  Presenting problems: moderate  Diagnostic procedures: moderate  Management options: moderate    Patient Progress  Patient progress: stable      Final diagnoses:   Transient alteration of awareness           Pk Ferrari Jr., MD  06/20/20 4029

## 2020-10-16 RX ORDER — OMEGA-3-ACID ETHYL ESTERS 1 G/1
1 CAPSULE, LIQUID FILLED ORAL DAILY
Qty: 90 CAPSULE | Refills: 3 | Status: SHIPPED | OUTPATIENT
Start: 2020-10-16 | End: 2021-10-22 | Stop reason: SDUPTHER

## 2021-05-05 ENCOUNTER — OFFICE VISIT (OUTPATIENT)
Dept: FAMILY MEDICINE CLINIC | Age: 61
End: 2021-05-05
Payer: COMMERCIAL

## 2021-05-05 VITALS
DIASTOLIC BLOOD PRESSURE: 72 MMHG | BODY MASS INDEX: 37.13 KG/M2 | RESPIRATION RATE: 16 BRPM | WEIGHT: 265.2 LBS | HEIGHT: 71 IN | TEMPERATURE: 96.3 F | SYSTOLIC BLOOD PRESSURE: 136 MMHG | HEART RATE: 117 BPM | OXYGEN SATURATION: 96 %

## 2021-05-05 DIAGNOSIS — N18.2 CHRONIC KIDNEY DISEASE, STAGE II (MILD): ICD-10-CM

## 2021-05-05 DIAGNOSIS — E78.01 FAMILIAL HYPERCHOLESTEROLEMIA: ICD-10-CM

## 2021-05-05 DIAGNOSIS — R53.83 OTHER FATIGUE: ICD-10-CM

## 2021-05-05 DIAGNOSIS — E78.2 MIXED HYPERLIPIDEMIA: ICD-10-CM

## 2021-05-05 DIAGNOSIS — M19.90 ARTHRITIS: ICD-10-CM

## 2021-05-05 DIAGNOSIS — Z12.11 SCREEN FOR COLON CANCER: ICD-10-CM

## 2021-05-05 DIAGNOSIS — K21.9 GASTROESOPHAGEAL REFLUX DISEASE WITHOUT ESOPHAGITIS: ICD-10-CM

## 2021-05-05 DIAGNOSIS — Z13.1 SCREENING FOR DIABETES MELLITUS: ICD-10-CM

## 2021-05-05 DIAGNOSIS — N40.0 BENIGN PROSTATIC HYPERPLASIA WITHOUT LOWER URINARY TRACT SYMPTOMS: ICD-10-CM

## 2021-05-05 DIAGNOSIS — E34.9 TESTOSTERONE DEFICIENCY: ICD-10-CM

## 2021-05-05 LAB
ALBUMIN SERPL-MCNC: 4.6 G/DL (ref 3.5–5.2)
ALP BLD-CCNC: 70 U/L (ref 40–130)
ALT SERPL-CCNC: 34 U/L (ref 5–41)
ANION GAP SERPL CALCULATED.3IONS-SCNC: 14 MMOL/L (ref 7–19)
AST SERPL-CCNC: 29 U/L (ref 5–40)
BILIRUB SERPL-MCNC: 0.5 MG/DL (ref 0.2–1.2)
BILIRUBIN DIRECT: 0.1 MG/DL (ref 0–0.3)
BILIRUBIN URINE: NEGATIVE
BILIRUBIN, INDIRECT: 0.4 MG/DL (ref 0.1–1)
BLOOD, URINE: NEGATIVE
BUN BLDV-MCNC: 16 MG/DL (ref 8–23)
CALCIUM SERPL-MCNC: 9.7 MG/DL (ref 8.8–10.2)
CHLORIDE BLD-SCNC: 106 MMOL/L (ref 98–111)
CHOLESTEROL, TOTAL: 245 MG/DL (ref 160–199)
CLARITY: CLEAR
CO2: 21 MMOL/L (ref 22–29)
COLOR: YELLOW
CREAT SERPL-MCNC: 0.8 MG/DL (ref 0.5–1.2)
GFR AFRICAN AMERICAN: >59
GFR NON-AFRICAN AMERICAN: >60
GLUCOSE BLD-MCNC: 98 MG/DL (ref 74–109)
GLUCOSE URINE: NEGATIVE MG/DL
HBA1C MFR BLD: 5.7 % (ref 4–6)
HCT VFR BLD CALC: 50.3 % (ref 42–52)
HDLC SERPL-MCNC: 39 MG/DL (ref 55–121)
HEMOGLOBIN: 16.4 G/DL (ref 14–18)
KETONES, URINE: NEGATIVE MG/DL
LDL CHOLESTEROL CALCULATED: 162 MG/DL
LEUKOCYTE ESTERASE, URINE: NEGATIVE
MCH RBC QN AUTO: 29.1 PG (ref 27–31)
MCHC RBC AUTO-ENTMCNC: 32.6 G/DL (ref 33–37)
MCV RBC AUTO: 89.2 FL (ref 80–94)
NITRITE, URINE: NEGATIVE
PDW BLD-RTO: 13.5 % (ref 11.5–14.5)
PH UA: 5 (ref 5–8)
PLATELET # BLD: 230 K/UL (ref 130–400)
PMV BLD AUTO: 11.7 FL (ref 9.4–12.4)
POTASSIUM SERPL-SCNC: 4.5 MMOL/L (ref 3.5–5)
PROTEIN UA: NEGATIVE MG/DL
RBC # BLD: 5.64 M/UL (ref 4.7–6.1)
SODIUM BLD-SCNC: 141 MMOL/L (ref 136–145)
SPECIFIC GRAVITY UA: 1.02 (ref 1–1.03)
T4 FREE: 1.13 NG/DL (ref 0.93–1.7)
TOTAL PROTEIN: 7.9 G/DL (ref 6.6–8.7)
TRIGL SERPL-MCNC: 219 MG/DL (ref 0–149)
TSH SERPL DL<=0.05 MIU/L-ACNC: 2.45 UIU/ML (ref 0.27–4.2)
UROBILINOGEN, URINE: 0.2 E.U./DL
VITAMIN D 25-HYDROXY: 50 NG/ML
WBC # BLD: 7.1 K/UL (ref 4.8–10.8)

## 2021-05-05 PROCEDURE — 99214 OFFICE O/P EST MOD 30 MIN: CPT | Performed by: FAMILY MEDICINE

## 2021-05-05 RX ORDER — CELECOXIB 200 MG/1
200 CAPSULE ORAL DAILY
Qty: 90 CAPSULE | Refills: 3 | Status: SHIPPED | OUTPATIENT
Start: 2021-05-05 | End: 2021-09-13 | Stop reason: SDUPTHER

## 2021-05-05 RX ORDER — TADALAFIL 20 MG/1
20 TABLET ORAL PRN
Qty: 30 TABLET | Refills: 3 | Status: SHIPPED | OUTPATIENT
Start: 2021-05-05

## 2021-05-05 ASSESSMENT — ENCOUNTER SYMPTOMS
VOMITING: 0
WHEEZING: 0
BLOOD IN STOOL: 0
COUGH: 0
CONSTIPATION: 0
SHORTNESS OF BREATH: 0
EYES NEGATIVE: 1
CHEST TIGHTNESS: 0
NAUSEA: 0
DIARRHEA: 0

## 2021-05-05 ASSESSMENT — PATIENT HEALTH QUESTIONNAIRE - PHQ9: 1. LITTLE INTEREST OR PLEASURE IN DOING THINGS: 0

## 2021-05-05 NOTE — PROGRESS NOTES
Subjective:      Patient ID: Herb Romero is a 56 y.o. male. HPI  Patient is seen here intermittently by the undersigned. Additionally, he is seen when new problems arise. Today he is here stating that he has had some fatigue now for quite some time. He has had this intermittently in the past.  Previously he was found to be deficient in vitamin D by DINA Cummins at the nephrology clinic. He does take vitamin D on a daily basis that is over-the-counter. He has no energy whatsoever. Last labs done on him had been done by Michelle about 6 months ago. Additionally, he is complaining of some erectile disc function so he potentially could have some low testosterone issues as well. We are going to check fasting labs today and this will include CBC, BMP, lipids, free T4, TSH, urinalysis, hepatic function panel, free and total PSA, free and total testosterone, vitamin D level, and hemoglobin A1c. Hemoglobin A1c is checked because he has had occasional slight elevation in sugar. Because of his complaint of erectile dysfunction, we are going to provide for him some Cialis 20 which he has used previously and found it to be effective. We are prescribing 20 mg, number 30 tablets and sending them to his preferred pharmacy. We did discuss colonoscopy. He did have colonoscopy at age 46. No polyps were identified. There is no family history of colon cancer. We are going to send the patient a Cologuard to test and send back for results thereof. I did inform him that if the test came back positive that he then would need to have colonoscopy done. He did voice understanding. He does have a history of hyperlipidemia. He did have recent labs done in March of 2019 which showed cholesterol of 254. His arthritic issues has impacted mobility which obviously would help cholesterol if he could be more mobile. He is on fish oil in the form of low vase a at 1 g taken daily.  He previously had taken statins but he Objective:   Physical Exam  Vitals signs and nursing note reviewed. Constitutional:       General: He is not in acute distress. Appearance: Normal appearance. He is well-developed. He is not ill-appearing, toxic-appearing or diaphoretic. HENT:      Head: Normocephalic and atraumatic. Right Ear: Tympanic membrane, ear canal and external ear normal. There is no impacted cerumen. Left Ear: Tympanic membrane, ear canal and external ear normal. There is no impacted cerumen. Nose: Nose normal.      Mouth/Throat:      Lips: Pink. Mouth: Mucous membranes are moist.      Dentition: Normal dentition. Tongue: No lesions. Pharynx: Oropharynx is clear. Uvula midline. Tonsils: No tonsillar exudate or tonsillar abscesses. Eyes:      General: Lids are normal. No scleral icterus. Right eye: No discharge. Left eye: No discharge. Extraocular Movements:      Right eye: Normal extraocular motion. Left eye: Normal extraocular motion. Conjunctiva/sclera: Conjunctivae normal.      Right eye: Right conjunctiva is not injected. Left eye: Left conjunctiva is not injected. Pupils: Pupils are equal, round, and reactive to light. Neck:      Musculoskeletal: Normal range of motion and neck supple. No neck rigidity or muscular tenderness. Thyroid: No thyromegaly. Vascular: No carotid bruit or JVD. Cardiovascular:      Rate and Rhythm: Normal rate and regular rhythm. Pulses:           Carotid pulses are 2+ on the right side and 2+ on the left side. Radial pulses are 2+ on the right side and 2+ on the left side. Heart sounds: Normal heart sounds, S1 normal and S2 normal. No murmur. No friction rub. No gallop. Pulmonary:      Effort: Pulmonary effort is normal. No accessory muscle usage or respiratory distress. Breath sounds: Normal breath sounds. No stridor. No wheezing, rhonchi or rales. Chest:      Chest wall: No tenderness. Abdominal:      General: Bowel sounds are normal. There is no distension or abdominal bruit. Palpations: Abdomen is soft. There is no mass. Tenderness: There is no abdominal tenderness. There is no right CVA tenderness, left CVA tenderness, guarding or rebound. Hernia: No hernia is present. Musculoskeletal: Normal range of motion. General: No swelling, tenderness, deformity or signs of injury. Right lower leg: No edema. Left lower leg: No edema. Lymphadenopathy:      Cervical: No cervical adenopathy. Right cervical: No superficial cervical adenopathy. Left cervical: No superficial cervical adenopathy. Skin:     General: Skin is warm and dry. Nails: There is no clubbing. Neurological:      General: No focal deficit present. Mental Status: He is alert and oriented to person, place, and time. Mental status is at baseline. Cranial Nerves: No facial asymmetry. Motor: No weakness or tremor. Coordination: Coordination normal.      Gait: Gait normal.      Deep Tendon Reflexes: Reflexes are normal and symmetric. Psychiatric:         Attention and Perception: Attention normal.         Mood and Affect: Mood normal.         Speech: Speech normal.         Behavior: Behavior normal.         Thought Content: Thought content normal.         Cognition and Memory: Memory normal.         Judgment: Judgment normal.         /72 (Site: Left Upper Arm, Position: Sitting, Cuff Size: Large Adult)   Pulse 117   Temp 96.3 °F (35.7 °C) (Infrared)   Resp 16   Ht 5' 11\" (1.803 m)   Wt 265 lb 3.2 oz (120.3 kg)   SpO2 96%   BMI 36.99 kg/m²   Assessment:         Diagnosis Orders   1. Familial hypercholesterolemia  Hepatic Function Panel    T4, Free    TSH without Reflex    Lipid Panel   2. Testosterone deficiency  Testosterone Free and Total Male    tadalafil (CIALIS) 20 MG tablet   3.  Other fatigue  CBC    Basic Metabolic Panel    Urinalysis    Vit[employer] @ [phone]. See [email or web address] for further information.      Standing Status:   Future     Standing Expiration Date:   5/5/2022    CBC     Standing Status:   Future     Number of Occurrences:   1     Standing Expiration Date:   5/5/2022    Basic Metabolic Panel     Standing Status:   Future     Number of Occurrences:   1     Standing Expiration Date:   5/5/2022    Hepatic Function Panel     Standing Status:   Future     Number of Occurrences:   1     Standing Expiration Date:   5/5/2022    T4, Free     Standing Status:   Future     Number of Occurrences:   1     Standing Expiration Date:   5/5/2022    TSH without Reflex     Standing Status:   Future     Number of Occurrences:   1     Standing Expiration Date:   5/5/2022    Urinalysis     Standing Status:   Future     Number of Occurrences:   1     Standing Expiration Date:   5/5/2022    Vitamin D 25 Hydroxy     Standing Status:   Future     Number of Occurrences:   1     Standing Expiration Date:   5/5/2022    Testosterone Free and Total Male     Standing Status:   Future     Number of Occurrences:   1     Standing Expiration Date:   5/5/2022    Lipid Panel     Standing Status:   Future     Number of Occurrences:   1     Standing Expiration Date:   5/5/2022     Order Specific Question:   Is Patient Fasting?/# of Hours     Answer:   8    Hemoglobin A1C     Standing Status:   Future     Number of Occurrences:   1     Standing Expiration Date:   5/5/2022    PSA, Total and Free     Standing Status:   Future     Number of Occurrences:   1     Standing Expiration Date:   5/5/2022     Orders Placed This Encounter   Medications    tadalafil (CIALIS) 20 MG tablet     Sig: Take 1 tablet by mouth as needed for Erectile Dysfunction     Dispense:  30 tablet     Refill:  3    celecoxib (CELEBREX) 200 MG capsule     Sig: Take 1 capsule by mouth daily     Dispense:  90 capsule     Refill:  3             Jaky Diaz MD

## 2021-05-06 LAB
PROSTATE SPECIFIC ANTIGEN FREE: 0.3 NG/ML
PROSTATE SPECIFIC ANTIGEN PERCENT FREE: 50 %
PROSTATE SPECIFIC ANTIGEN: 0.6 NG/ML (ref 0–4)

## 2021-05-07 LAB
SEX HORMONE BINDING GLOBULIN: 32 NMOL/L (ref 11–80)
TESTOSTERONE FREE-NONMALE: 63.9 PG/ML (ref 47–244)
TESTOSTERONE TOTAL: 317 NG/DL (ref 220–1000)

## 2021-05-11 ENCOUNTER — TELEPHONE (OUTPATIENT)
Dept: FAMILY MEDICINE CLINIC | Age: 61
End: 2021-05-11

## 2021-05-14 ENCOUNTER — TELEPHONE (OUTPATIENT)
Dept: FAMILY MEDICINE CLINIC | Age: 61
End: 2021-05-14

## 2021-09-13 DIAGNOSIS — M19.90 ARTHRITIS: ICD-10-CM

## 2021-09-13 RX ORDER — CELECOXIB 200 MG/1
200 CAPSULE ORAL DAILY
Qty: 90 CAPSULE | Refills: 3 | Status: SHIPPED | OUTPATIENT
Start: 2021-09-13

## 2021-09-14 DIAGNOSIS — J01.11 ACUTE RECURRENT FRONTAL SINUSITIS: ICD-10-CM

## 2021-09-14 RX ORDER — MONTELUKAST SODIUM 10 MG/1
TABLET ORAL
Qty: 90 TABLET | Refills: 3 | Status: SHIPPED | OUTPATIENT
Start: 2021-09-14

## 2021-10-08 ENCOUNTER — HOSPITAL ENCOUNTER (OUTPATIENT)
Dept: GENERAL RADIOLOGY | Age: 61
Discharge: HOME | End: 2021-10-08
Payer: COMMERCIAL

## 2021-10-08 ENCOUNTER — HOSPITAL ENCOUNTER (OUTPATIENT)
Dept: PREADMISSION TESTING | Age: 61
Discharge: HOME | End: 2021-10-12
Payer: COMMERCIAL

## 2021-10-08 VITALS — WEIGHT: 264 LBS | HEIGHT: 70 IN | BODY MASS INDEX: 37.8 KG/M2

## 2021-10-08 LAB
ABO/RH: NORMAL
ALBUMIN SERPL-MCNC: 4.1 G/DL (ref 3.5–5.2)
ALP BLD-CCNC: 77 U/L (ref 40–130)
ALT SERPL-CCNC: 59 U/L (ref 5–41)
ANION GAP SERPL CALCULATED.3IONS-SCNC: 11 MMOL/L (ref 7–19)
ANTIBODY SCREEN: NORMAL
APTT: 27.7 SEC (ref 26–36.2)
AST SERPL-CCNC: 52 U/L (ref 5–40)
BACTERIA: NEGATIVE /HPF
BASOPHILS ABSOLUTE: 0.1 K/UL (ref 0–0.2)
BASOPHILS RELATIVE PERCENT: 1.1 % (ref 0–1)
BILIRUB SERPL-MCNC: 0.4 MG/DL (ref 0.2–1.2)
BILIRUBIN URINE: NEGATIVE
BLOOD, URINE: NEGATIVE
BUN BLDV-MCNC: 14 MG/DL (ref 8–23)
CALCIUM SERPL-MCNC: 9.4 MG/DL (ref 8.8–10.2)
CHLORIDE BLD-SCNC: 104 MMOL/L (ref 98–111)
CLARITY: CLEAR
CO2: 22 MMOL/L (ref 22–29)
COLOR: YELLOW
CREAT SERPL-MCNC: 0.7 MG/DL (ref 0.5–1.2)
CRYSTALS, UA: ABNORMAL /HPF
EOSINOPHILS ABSOLUTE: 0.3 K/UL (ref 0–0.6)
EOSINOPHILS RELATIVE PERCENT: 4.9 % (ref 0–5)
EPITHELIAL CELLS, UA: 1 /HPF (ref 0–5)
GFR AFRICAN AMERICAN: >59
GFR NON-AFRICAN AMERICAN: >60
GLUCOSE BLD-MCNC: 101 MG/DL (ref 74–109)
GLUCOSE URINE: NEGATIVE MG/DL
HCT VFR BLD CALC: 47.8 % (ref 42–52)
HEMOGLOBIN: 15.1 G/DL (ref 14–18)
HYALINE CASTS: 1 /HPF (ref 0–8)
IMMATURE GRANULOCYTES #: 0 K/UL
INR BLD: 0.98 (ref 0.88–1.18)
KETONES, URINE: NEGATIVE MG/DL
LEUKOCYTE ESTERASE, URINE: ABNORMAL
LYMPHOCYTES ABSOLUTE: 2.7 K/UL (ref 1.1–4.5)
LYMPHOCYTES RELATIVE PERCENT: 42.6 % (ref 20–40)
MCH RBC QN AUTO: 28.5 PG (ref 27–31)
MCHC RBC AUTO-ENTMCNC: 31.6 G/DL (ref 33–37)
MCV RBC AUTO: 90.2 FL (ref 80–94)
MONOCYTES ABSOLUTE: 0.7 K/UL (ref 0–0.9)
MONOCYTES RELATIVE PERCENT: 10.2 % (ref 0–10)
MRSA SCREEN RT-PCR: NOT DETECTED
NEUTROPHILS ABSOLUTE: 2.6 K/UL (ref 1.5–7.5)
NEUTROPHILS RELATIVE PERCENT: 41 % (ref 50–65)
NITRITE, URINE: NEGATIVE
PDW BLD-RTO: 13.1 % (ref 11.5–14.5)
PH UA: 6 (ref 5–8)
PLATELET # BLD: 231 K/UL (ref 130–400)
PMV BLD AUTO: 11.4 FL (ref 9.4–12.4)
POTASSIUM SERPL-SCNC: 4.1 MMOL/L (ref 3.5–5)
PROTEIN UA: NEGATIVE MG/DL
PROTHROMBIN TIME: 13.2 SEC (ref 12–14.6)
RBC # BLD: 5.3 M/UL (ref 4.7–6.1)
RBC UA: 1 /HPF (ref 0–4)
SODIUM BLD-SCNC: 137 MMOL/L (ref 136–145)
SPECIFIC GRAVITY UA: 1.02 (ref 1–1.03)
TOTAL PROTEIN: 7.4 G/DL (ref 6.6–8.7)
UROBILINOGEN, URINE: 1 E.U./DL
WBC # BLD: 6.4 K/UL (ref 4.8–10.8)
WBC UA: 2 /HPF (ref 0–5)

## 2021-10-08 PROCEDURE — 85730 THROMBOPLASTIN TIME PARTIAL: CPT

## 2021-10-08 PROCEDURE — 87641 MR-STAPH DNA AMP PROBE: CPT

## 2021-10-08 PROCEDURE — 71046 X-RAY EXAM CHEST 2 VIEWS: CPT

## 2021-10-08 PROCEDURE — 81001 URINALYSIS AUTO W/SCOPE: CPT

## 2021-10-08 PROCEDURE — 86901 BLOOD TYPING SEROLOGIC RH(D): CPT

## 2021-10-08 PROCEDURE — 85025 COMPLETE CBC W/AUTO DIFF WBC: CPT

## 2021-10-08 PROCEDURE — 93005 ELECTROCARDIOGRAM TRACING: CPT | Performed by: ORTHOPAEDIC SURGERY

## 2021-10-08 PROCEDURE — 85610 PROTHROMBIN TIME: CPT

## 2021-10-08 PROCEDURE — 80053 COMPREHEN METABOLIC PANEL: CPT

## 2021-10-08 PROCEDURE — 86850 RBC ANTIBODY SCREEN: CPT

## 2021-10-08 PROCEDURE — 86900 BLOOD TYPING SEROLOGIC ABO: CPT

## 2021-10-08 RX ORDER — ALLOPURINOL 100 MG/1
100 TABLET ORAL DAILY PRN
COMMUNITY

## 2021-10-08 RX ORDER — OXYCODONE HCL 10 MG/1
10 TABLET, FILM COATED, EXTENDED RELEASE ORAL ONCE
Status: CANCELLED | OUTPATIENT
Start: 2021-10-25

## 2021-10-08 RX ORDER — DEXAMETHASONE SODIUM PHOSPHATE 10 MG/ML
10 INJECTION, SOLUTION INTRAMUSCULAR; INTRAVENOUS ONCE
Status: CANCELLED | OUTPATIENT
Start: 2021-10-25

## 2021-10-08 RX ORDER — ASCORBIC ACID 500 MG
1000 TABLET ORAL DAILY
COMMUNITY

## 2021-10-08 RX ORDER — ACETAMINOPHEN 500 MG
1000 TABLET ORAL ONCE
Status: CANCELLED | OUTPATIENT
Start: 2021-10-25

## 2021-10-08 RX ORDER — PREGABALIN 75 MG/1
75 CAPSULE ORAL ONCE
Status: CANCELLED | OUTPATIENT
Start: 2021-10-25

## 2021-10-10 LAB
EKG P AXIS: 44 DEGREES
EKG P-R INTERVAL: 192 MS
EKG Q-T INTERVAL: 358 MS
EKG QRS DURATION: 90 MS
EKG QTC CALCULATION (BAZETT): 393 MS
EKG T AXIS: 45 DEGREES

## 2021-10-10 PROCEDURE — 93010 ELECTROCARDIOGRAM REPORT: CPT | Performed by: INTERNAL MEDICINE

## 2021-10-15 ENCOUNTER — OFFICE VISIT (OUTPATIENT)
Dept: PRIMARY CARE CLINIC | Age: 61
End: 2021-10-15
Payer: COMMERCIAL

## 2021-10-15 VITALS
TEMPERATURE: 97.2 F | HEART RATE: 76 BPM | BODY MASS INDEX: 38.45 KG/M2 | RESPIRATION RATE: 18 BRPM | DIASTOLIC BLOOD PRESSURE: 74 MMHG | WEIGHT: 268 LBS | SYSTOLIC BLOOD PRESSURE: 136 MMHG | OXYGEN SATURATION: 98 %

## 2021-10-15 DIAGNOSIS — J01.90 ACUTE NON-RECURRENT SINUSITIS, UNSPECIFIED LOCATION: Primary | ICD-10-CM

## 2021-10-15 DIAGNOSIS — J30.2 SEASONAL ALLERGIC RHINITIS, UNSPECIFIED TRIGGER: ICD-10-CM

## 2021-10-15 PROCEDURE — 99213 OFFICE O/P EST LOW 20 MIN: CPT | Performed by: NURSE PRACTITIONER

## 2021-10-15 RX ORDER — CETIRIZINE HYDROCHLORIDE 10 MG/1
10 TABLET ORAL DAILY
Qty: 30 TABLET | Refills: 0 | Status: SHIPPED | OUTPATIENT
Start: 2021-10-15 | End: 2021-11-14

## 2021-10-15 RX ORDER — AMOXICILLIN 500 MG/1
500 CAPSULE ORAL 2 TIMES DAILY
Qty: 20 CAPSULE | Refills: 0 | Status: ON HOLD | OUTPATIENT
Start: 2021-10-15 | End: 2021-10-25 | Stop reason: HOSPADM

## 2021-10-15 RX ORDER — PSEUDOEPHEDRINE HYDROCHLORIDE 30 MG/1
30 TABLET ORAL EVERY 4 HOURS PRN
COMMUNITY

## 2021-10-15 ASSESSMENT — ENCOUNTER SYMPTOMS
COUGH: 1
HEMOPTYSIS: 0
RHINORRHEA: 1
SINUS PAIN: 1
TROUBLE SWALLOWING: 0
SHORTNESS OF BREATH: 0
SINUS PRESSURE: 1
WHEEZING: 0
SORE THROAT: 0

## 2021-10-15 NOTE — PROGRESS NOTES
He has tried leukotriene antagonists for the symptoms. The treatment provided no relief. His past medical history is significant for environmental allergies. Review of Systems   Constitutional: Negative for chills and fever. HENT: Positive for congestion, postnasal drip, rhinorrhea, sinus pressure and sinus pain. Negative for sore throat and trouble swallowing. Respiratory: Positive for cough. Negative for hemoptysis, shortness of breath and wheezing. Cardiovascular: Negative for chest pain. Musculoskeletal: Negative for myalgias. Skin: Negative for rash. Allergic/Immunologic: Positive for environmental allergies. Allergies   Allergen Reactions    Latex Rash    Imodium [Loperamide] Hives and Itching    Minocycline Rash       Outpatient Medications Prior to Visit   Medication Sig Dispense Refill    pseudoephedrine (SUDAFED) 30 MG tablet Take 30 mg by mouth every 4 hours as needed for Congestion      vitamin D 25 MCG (1000 UT) CAPS Take 1 capsule by mouth daily      zinc 50 MG CAPS Take 1 capsule by mouth daily      ascorbic acid (VITAMIN C) 500 MG tablet Take 1,000 mg by mouth daily      allopurinol (ZYLOPRIM) 100 MG tablet Take 100 mg by mouth daily as needed      montelukast (SINGULAIR) 10 MG tablet TAKE 1 TABLET DAILY (Patient taking differently: Take 10 mg by mouth nightly TAKE 1 TABLET DAILY) 90 tablet 3    celecoxib (CELEBREX) 200 MG capsule Take 1 capsule by mouth daily (Patient taking differently: Take 200 mg by mouth daily as needed ) 90 capsule 3    Turmeric (QC TUMERIC COMPLEX PO) Take 1 each by mouth 2 times daily      tadalafil (CIALIS) 20 MG tablet Take 1 tablet by mouth as needed for Erectile Dysfunction 30 tablet 3    omega-3 acid ethyl esters (LOVAZA) 1 g capsule Take 1 capsule by mouth daily 90 capsule 3     No facility-administered medications prior to visit.         Past Medical History:   Diagnosis Date    Congenital absence of one kidney     ED (erectile dysfunction)     GERD (gastroesophageal reflux disease)     hx of    Gout     Hip pain     [place] (hard of hearing)     Hyperlipidemia     mild    Osteoarthritis     PONV (postoperative nausea and vomiting)         Social History     Tobacco Use    Smoking status: Never Smoker    Smokeless tobacco: Never Used   Substance Use Topics    Alcohol use: Yes     Alcohol/week: 1.0 standard drinks     Types: 1 Cans of beer per week     Comment: OCC        Past Surgical History:   Procedure Laterality Date    CARPAL TUNNEL RELEASE Bilateral      X 2 ON EACH SIDE    JOINT REPLACEMENT  01/31/2020    Left knee Dr. Libia Acuna ARTHROSCOPY Left 4/18/2016    KNEE ARTHROSCOPY WITH TIBIAL AND FEMORAL SUBCHONDROPLASTY performed by Juan Antonio [facility] MD at [facility] Bilateral     NE KNEE [address] Right 10/18/2017    KNEE ARTHROSCOPY FSAI performed by Juan Antonio Morton MD at [address] [place] [address] Bilateral        Family History   Problem Relation Age of Onset    High Blood Pressure Mother     Cancer Mother     Heart Attack Father 61    Other Sister     High Cholesterol Brother     High Cholesterol Brother        Objective       /74 (Site: Right Upper Arm, Position: Sitting)   Pulse 76   Temp 97.2 °F (36.2 °C) (Temporal)   Resp 18   Wt 268 lb (121.6 kg)   SpO2 98%   BMI 38.45 kg/m²   Physical Exam  Vitals and nursing note reviewed. Constitutional:       General: He is not in acute distress. Appearance: Normal appearance. He is not ill-appearing. HENT:      Head: Normocephalic. Right Ear: External ear normal.      Left Ear: External ear normal.      Nose: Congestion and rhinorrhea present. Mouth/Throat:      Pharynx: No oropharyngeal exudate or posterior oropharyngeal erythema. Eyes:      Pupils: Pupils are equal, round, and reactive to light. Cardiovascular:      Rate and Rhythm: Normal rate and regular rhythm. Heart sounds: Normal heart sounds. Pulmonary:      Effort: Pulmonary effort is normal. No respiratory distress. Breath sounds: Normal breath sounds. No wheezing or rhonchi. Musculoskeletal:      Cervical back: Normal range of motion. Lymphadenopathy:      Cervical: No cervical adenopathy. Skin:     General: Skin is warm and dry. Neurological:      Mental Status: He is alert and oriented to person, place, and time.                  Celsa Schultz, APRN - CNP

## 2021-10-15 NOTE — PATIENT INSTRUCTIONS
Antibiotic for sinus infection. May use your sudafed if needed for the nasal congestion. Use zyrtec for the running and the post nasal drip. May continue your singulair. Otherwise FU with PCP.

## 2021-10-22 ENCOUNTER — HOSPITAL ENCOUNTER (OUTPATIENT)
Dept: PREADMISSION TESTING | Age: 61
Discharge: HOME | End: 2021-10-26
Payer: COMMERCIAL

## 2021-10-22 LAB — SARS-COV-2, PCR: NOT DETECTED

## 2021-10-22 PROCEDURE — U0003 INFECTIOUS AGENT DETECTION BY NUCLEIC ACID (DNA OR RNA); SEVERE ACUTE RESPIRATORY SYNDROME CORONAVIRUS 2 (SARS-COV-2) (CORONAVIRUS DISEASE [COVID-19]), AMPLIFIED PROBE TECHNIQUE, MAKING USE OF HIGH THROUGHPUT TECHNOLOGIES AS DESCRIBED BY CMS-2020-01-R: HCPCS

## 2021-10-22 PROCEDURE — U0005 INFEC AGEN DETEC AMPLI PROBE: HCPCS

## 2021-10-22 RX ORDER — OMEGA-3-ACID ETHYL ESTERS 1 G/1
1 CAPSULE, LIQUID FILLED ORAL DAILY
Qty: 90 CAPSULE | Refills: 3 | Status: SHIPPED | OUTPATIENT
Start: 2021-10-22

## 2021-10-25 ENCOUNTER — ANESTHESIA EVENT (OUTPATIENT)
Dept: OPERATING ROOM | Age: 61
End: 2021-10-25
Payer: COMMERCIAL

## 2021-10-25 ENCOUNTER — APPOINTMENT (OUTPATIENT)
Dept: GENERAL RADIOLOGY | Age: 61
End: 2021-10-25
Payer: COMMERCIAL

## 2021-10-25 ENCOUNTER — APPOINTMENT (OUTPATIENT)
Dept: CT IMAGING | Age: 61
End: 2021-10-25
Payer: COMMERCIAL

## 2021-10-25 ENCOUNTER — APPOINTMENT (OUTPATIENT)
Dept: GENERAL RADIOLOGY | Age: 61
End: 2021-10-25
Attending: ORTHOPAEDIC SURGERY
Payer: COMMERCIAL

## 2021-10-25 ENCOUNTER — HOSPITAL ENCOUNTER (OUTPATIENT)
Age: 61
Setting detail: OUTPATIENT SURGERY
Discharge: HOME | End: 2021-10-25
Attending: ORTHOPAEDIC SURGERY | Admitting: ORTHOPAEDIC SURGERY
Payer: COMMERCIAL

## 2021-10-25 ENCOUNTER — ANESTHESIA (OUTPATIENT)
Dept: OPERATING ROOM | Age: 61
End: 2021-10-25
Payer: COMMERCIAL

## 2021-10-25 ENCOUNTER — HOSPITAL ENCOUNTER (EMERGENCY)
Age: 61
Discharge: ANOTHER HOSPITAL | End: 2021-10-26
Attending: EMERGENCY MEDICINE
Payer: COMMERCIAL

## 2021-10-25 VITALS
OXYGEN SATURATION: 94 % | SYSTOLIC BLOOD PRESSURE: 156 MMHG | HEART RATE: 88 BPM | DIASTOLIC BLOOD PRESSURE: 83 MMHG | TEMPERATURE: 97.1 F | RESPIRATION RATE: 16 BRPM | HEIGHT: 70 IN | WEIGHT: 264 LBS | BODY MASS INDEX: 37.8 KG/M2

## 2021-10-25 VITALS — OXYGEN SATURATION: 92 % | SYSTOLIC BLOOD PRESSURE: 101 MMHG | TEMPERATURE: 97.5 F | DIASTOLIC BLOOD PRESSURE: 55 MMHG

## 2021-10-25 DIAGNOSIS — I63.9 CEREBROVASCULAR ACCIDENT (CVA), UNSPECIFIED MECHANISM (HCC): Primary | ICD-10-CM

## 2021-10-25 DIAGNOSIS — I71.019 DISSECTION OF THORACIC AORTA: ICD-10-CM

## 2021-10-25 DIAGNOSIS — M16.11 PRIMARY OSTEOARTHRITIS OF RIGHT HIP: Primary | ICD-10-CM

## 2021-10-25 LAB
ABO/RH: NORMAL
ALBUMIN SERPL-MCNC: 3.8 G/DL (ref 3.5–5.2)
ALP BLD-CCNC: 59 U/L (ref 40–130)
ALT SERPL-CCNC: 54 U/L (ref 5–41)
ANION GAP SERPL CALCULATED.3IONS-SCNC: 24 MMOL/L (ref 7–19)
ANTIBODY SCREEN: NORMAL
APTT: 24.2 SEC (ref 26–36.2)
AST SERPL-CCNC: 52 U/L (ref 5–40)
BASOPHILS ABSOLUTE: 0 K/UL (ref 0–0.2)
BASOPHILS RELATIVE PERCENT: 0.1 % (ref 0–1)
BILIRUB SERPL-MCNC: <0.2 MG/DL (ref 0.2–1.2)
BUN BLDV-MCNC: 13 MG/DL (ref 8–23)
CALCIUM SERPL-MCNC: 8.3 MG/DL (ref 8.8–10.2)
CHLORIDE BLD-SCNC: 99 MMOL/L (ref 98–111)
CO2: 13 MMOL/L (ref 22–29)
CREAT SERPL-MCNC: 1 MG/DL (ref 0.5–1.2)
EOSINOPHILS ABSOLUTE: 0 K/UL (ref 0–0.6)
EOSINOPHILS RELATIVE PERCENT: 0 % (ref 0–5)
GFR AFRICAN AMERICAN: >59
GFR NON-AFRICAN AMERICAN: >60
GLUCOSE BLD-MCNC: 173 MG/DL (ref 70–99)
GLUCOSE BLD-MCNC: 205 MG/DL (ref 70–99)
GLUCOSE BLD-MCNC: 238 MG/DL (ref 74–109)
HCT VFR BLD CALC: 43.5 % (ref 42–52)
HEMOGLOBIN: 13.5 G/DL (ref 14–18)
IMMATURE GRANULOCYTES #: 0.1 K/UL
INR BLD: 1.23 (ref 0.88–1.18)
LYMPHOCYTES ABSOLUTE: 2 K/UL (ref 1.1–4.5)
LYMPHOCYTES RELATIVE PERCENT: 13.7 % (ref 20–40)
MCH RBC QN AUTO: 29 PG (ref 27–31)
MCHC RBC AUTO-ENTMCNC: 31 G/DL (ref 33–37)
MCV RBC AUTO: 93.3 FL (ref 80–94)
MONOCYTES ABSOLUTE: 0.5 K/UL (ref 0–0.9)
MONOCYTES RELATIVE PERCENT: 3.1 % (ref 0–10)
NEUTROPHILS ABSOLUTE: 12 K/UL (ref 1.5–7.5)
NEUTROPHILS RELATIVE PERCENT: 82.5 % (ref 50–65)
PDW BLD-RTO: 13.6 % (ref 11.5–14.5)
PERFORMED ON: ABNORMAL
PERFORMED ON: ABNORMAL
PLATELET # BLD: 221 K/UL (ref 130–400)
PMV BLD AUTO: 11 FL (ref 9.4–12.4)
POTASSIUM SERPL-SCNC: 4.2 MMOL/L (ref 3.5–5)
PRO-BNP: 13 PG/ML (ref 0–900)
PROTHROMBIN TIME: 15.6 SEC (ref 12–14.6)
RBC # BLD: 4.66 M/UL (ref 4.7–6.1)
SARS-COV-2, NAAT: NOT DETECTED
SODIUM BLD-SCNC: 136 MMOL/L (ref 136–145)
TOTAL PROTEIN: 6.6 G/DL (ref 6.6–8.7)
TROPONIN: <0.01 NG/ML (ref 0–0.03)
WBC # BLD: 14.5 K/UL (ref 4.8–10.8)

## 2021-10-25 PROCEDURE — 2580000003 HC RX 258: Performed by: ANESTHESIOLOGY

## 2021-10-25 PROCEDURE — 84484 ASSAY OF TROPONIN QUANT: CPT

## 2021-10-25 PROCEDURE — 2500000003 HC RX 250 WO HCPCS: Performed by: ORTHOPAEDIC SURGERY

## 2021-10-25 PROCEDURE — 2580000003 HC RX 258: Performed by: ORTHOPAEDIC SURGERY

## 2021-10-25 PROCEDURE — 70496 CT ANGIOGRAPHY HEAD: CPT

## 2021-10-25 PROCEDURE — 3700000000 HC ANESTHESIA ATTENDED CARE: Performed by: ORTHOPAEDIC SURGERY

## 2021-10-25 PROCEDURE — 6360000002 HC RX W HCPCS: Performed by: ORTHOPAEDIC SURGERY

## 2021-10-25 PROCEDURE — 86900 BLOOD TYPING SEROLOGIC ABO: CPT

## 2021-10-25 PROCEDURE — 86901 BLOOD TYPING SEROLOGIC RH(D): CPT

## 2021-10-25 PROCEDURE — 6360000002 HC RX W HCPCS: Performed by: ANESTHESIOLOGY

## 2021-10-25 PROCEDURE — 93005 ELECTROCARDIOGRAM TRACING: CPT | Performed by: EMERGENCY MEDICINE

## 2021-10-25 PROCEDURE — 2500000003 HC RX 250 WO HCPCS: Performed by: EMERGENCY MEDICINE

## 2021-10-25 PROCEDURE — 6360000002 HC RX W HCPCS: Performed by: EMERGENCY MEDICINE

## 2021-10-25 PROCEDURE — 97161 PT EVAL LOW COMPLEX 20 MIN: CPT

## 2021-10-25 PROCEDURE — 73502 X-RAY EXAM HIP UNI 2-3 VIEWS: CPT

## 2021-10-25 PROCEDURE — 2500000003 HC RX 250 WO HCPCS: Performed by: ANESTHESIOLOGY

## 2021-10-25 PROCEDURE — 85730 THROMBOPLASTIN TIME PARTIAL: CPT

## 2021-10-25 PROCEDURE — 7100000010 HC PHASE II RECOVERY - FIRST 15 MIN: Performed by: ORTHOPAEDIC SURGERY

## 2021-10-25 PROCEDURE — 70498 CT ANGIOGRAPHY NECK: CPT

## 2021-10-25 PROCEDURE — 96375 TX/PRO/DX INJ NEW DRUG ADDON: CPT

## 2021-10-25 PROCEDURE — 82947 ASSAY GLUCOSE BLOOD QUANT: CPT

## 2021-10-25 PROCEDURE — 36415 COLL VENOUS BLD VENIPUNCTURE: CPT

## 2021-10-25 PROCEDURE — 6370000000 HC RX 637 (ALT 250 FOR IP): Performed by: ANESTHESIOLOGY

## 2021-10-25 PROCEDURE — 71045 X-RAY EXAM CHEST 1 VIEW: CPT

## 2021-10-25 PROCEDURE — 7100000011 HC PHASE II RECOVERY - ADDTL 15 MIN: Performed by: ORTHOPAEDIC SURGERY

## 2021-10-25 PROCEDURE — 6360000004 HC RX CONTRAST MEDICATION: Performed by: EMERGENCY MEDICINE

## 2021-10-25 PROCEDURE — 99285 EMERGENCY DEPT VISIT HI MDM: CPT

## 2021-10-25 PROCEDURE — 6370000000 HC RX 637 (ALT 250 FOR IP): Performed by: ORTHOPAEDIC SURGERY

## 2021-10-25 PROCEDURE — 3700000001 HC ADD 15 MINUTES (ANESTHESIA): Performed by: ORTHOPAEDIC SURGERY

## 2021-10-25 PROCEDURE — 87635 SARS-COV-2 COVID-19 AMP PRB: CPT

## 2021-10-25 PROCEDURE — 99291 CRITICAL CARE FIRST HOUR: CPT

## 2021-10-25 PROCEDURE — 83880 ASSAY OF NATRIURETIC PEPTIDE: CPT

## 2021-10-25 PROCEDURE — 2700000000 HC OXYGEN THERAPY PER DAY

## 2021-10-25 PROCEDURE — C1713 ANCHOR/SCREW BN/BN,TIS/BN: HCPCS | Performed by: ORTHOPAEDIC SURGERY

## 2021-10-25 PROCEDURE — 99281 EMR DPT VST MAYX REQ PHY/QHP: CPT

## 2021-10-25 PROCEDURE — 3209999900 FLUORO FOR SURGICAL PROCEDURES

## 2021-10-25 PROCEDURE — 2720000010 HC SURG SUPPLY STERILE: Performed by: ORTHOPAEDIC SURGERY

## 2021-10-25 PROCEDURE — 85025 COMPLETE CBC W/AUTO DIFF WBC: CPT

## 2021-10-25 PROCEDURE — 31500 INSERT EMERGENCY AIRWAY: CPT

## 2021-10-25 PROCEDURE — C1776 JOINT DEVICE (IMPLANTABLE): HCPCS | Performed by: ORTHOPAEDIC SURGERY

## 2021-10-25 PROCEDURE — 2709999900 HC NON-CHARGEABLE SUPPLY: Performed by: ORTHOPAEDIC SURGERY

## 2021-10-25 PROCEDURE — 7100000001 HC PACU RECOVERY - ADDTL 15 MIN: Performed by: ORTHOPAEDIC SURGERY

## 2021-10-25 PROCEDURE — C9290 INJ, BUPIVACAINE LIPOSOME: HCPCS | Performed by: ORTHOPAEDIC SURGERY

## 2021-10-25 PROCEDURE — 3600000015 HC SURGERY LEVEL 5 ADDTL 15MIN: Performed by: ORTHOPAEDIC SURGERY

## 2021-10-25 PROCEDURE — 96374 THER/PROPH/DIAG INJ IV PUSH: CPT

## 2021-10-25 PROCEDURE — 3600000005 HC SURGERY LEVEL 5 BASE: Performed by: ORTHOPAEDIC SURGERY

## 2021-10-25 PROCEDURE — 7100000000 HC PACU RECOVERY - FIRST 15 MIN: Performed by: ORTHOPAEDIC SURGERY

## 2021-10-25 PROCEDURE — 80053 COMPREHEN METABOLIC PANEL: CPT

## 2021-10-25 PROCEDURE — 86850 RBC ANTIBODY SCREEN: CPT

## 2021-10-25 PROCEDURE — 70450 CT HEAD/BRAIN W/O DYE: CPT

## 2021-10-25 PROCEDURE — 85610 PROTHROMBIN TIME: CPT

## 2021-10-25 DEVICE — SCREW BNE L25MM DIA6.5MM CANC HIP S STL GRIPTION FULL THRD: Type: IMPLANTABLE DEVICE | Site: HIP | Status: FUNCTIONAL

## 2021-10-25 DEVICE — CUP ACET DIA52MM 10 H HIP TI GRIPTION MULT H II VIP TAPR: Type: IMPLANTABLE DEVICE | Site: HIP | Status: FUNCTIONAL

## 2021-10-25 DEVICE — HEAD FEM DIA36MM +5MM OFFSET 12/14 TAPR HIP CERAMIC BIOLOX: Type: IMPLANTABLE DEVICE | Site: HIP | Status: FUNCTIONAL

## 2021-10-25 DEVICE — LINER ACET OD52MM ID36MM HIP ALTRX PINN: Type: IMPLANTABLE DEVICE | Site: HIP | Status: FUNCTIONAL

## 2021-10-25 DEVICE — STEM FEM SZ 7 HIP HI OFFSET CLLRD CEMENTLESS 12/14 TAPR: Type: IMPLANTABLE DEVICE | Site: HIP | Status: FUNCTIONAL

## 2021-10-25 RX ORDER — ROCURONIUM BROMIDE 10 MG/ML
INJECTION, SOLUTION INTRAVENOUS PRN
Status: DISCONTINUED | OUTPATIENT
Start: 2021-10-25 | End: 2021-10-25 | Stop reason: SDUPTHER

## 2021-10-25 RX ORDER — SCOLOPAMINE TRANSDERMAL SYSTEM 1 MG/1
1 PATCH, EXTENDED RELEASE TRANSDERMAL ONCE
Status: DISCONTINUED | OUTPATIENT
Start: 2021-10-25 | End: 2021-10-25 | Stop reason: HOSPADM

## 2021-10-25 RX ORDER — TRANEXAMIC ACID 100 MG/ML
INJECTION, SOLUTION INTRAVENOUS PRN
Status: DISCONTINUED | OUTPATIENT
Start: 2021-10-25 | End: 2021-10-25 | Stop reason: SDUPTHER

## 2021-10-25 RX ORDER — APREPITANT 40 MG/1
40 CAPSULE ORAL ONCE
Status: COMPLETED | OUTPATIENT
Start: 2021-10-25 | End: 2021-10-25

## 2021-10-25 RX ORDER — SODIUM CHLORIDE 0.9 % (FLUSH) 0.9 %
5-40 SYRINGE (ML) INJECTION PRN
Status: DISCONTINUED | OUTPATIENT
Start: 2021-10-25 | End: 2021-10-25 | Stop reason: HOSPADM

## 2021-10-25 RX ORDER — MORPHINE SULFATE 4 MG/ML
4 INJECTION, SOLUTION INTRAMUSCULAR; INTRAVENOUS
Status: DISCONTINUED | OUTPATIENT
Start: 2021-10-25 | End: 2021-10-25 | Stop reason: HOSPADM

## 2021-10-25 RX ORDER — FENTANYL CITRATE 50 UG/ML
50 INJECTION, SOLUTION INTRAMUSCULAR; INTRAVENOUS EVERY 5 MIN PRN
Status: DISCONTINUED | OUTPATIENT
Start: 2021-10-25 | End: 2021-10-25 | Stop reason: HOSPADM

## 2021-10-25 RX ORDER — HYDROMORPHONE HYDROCHLORIDE 1 MG/ML
0.25 INJECTION, SOLUTION INTRAMUSCULAR; INTRAVENOUS; SUBCUTANEOUS EVERY 5 MIN PRN
Status: DISCONTINUED | OUTPATIENT
Start: 2021-10-25 | End: 2021-10-25 | Stop reason: HOSPADM

## 2021-10-25 RX ORDER — ONDANSETRON 4 MG/1
4 TABLET, FILM COATED ORAL EVERY 8 HOURS PRN
Qty: 30 TABLET | Refills: 0 | Status: SHIPPED | OUTPATIENT
Start: 2021-10-25

## 2021-10-25 RX ORDER — ONDANSETRON 2 MG/ML
4 INJECTION INTRAMUSCULAR; INTRAVENOUS
Status: COMPLETED | OUTPATIENT
Start: 2021-10-25 | End: 2021-10-25

## 2021-10-25 RX ORDER — SODIUM CHLORIDE 0.9 % (FLUSH) 0.9 %
5-40 SYRINGE (ML) INJECTION EVERY 12 HOURS SCHEDULED
Status: DISCONTINUED | OUTPATIENT
Start: 2021-10-25 | End: 2021-10-25 | Stop reason: HOSPADM

## 2021-10-25 RX ORDER — MIDAZOLAM HYDROCHLORIDE 1 MG/ML
INJECTION INTRAMUSCULAR; INTRAVENOUS PRN
Status: DISCONTINUED | OUTPATIENT
Start: 2021-10-25 | End: 2021-10-25 | Stop reason: SDUPTHER

## 2021-10-25 RX ORDER — ACETAMINOPHEN 500 MG
1000 TABLET ORAL ONCE
Status: COMPLETED | OUTPATIENT
Start: 2021-10-25 | End: 2021-10-25

## 2021-10-25 RX ORDER — RIVAROXABAN 10 MG/1
TABLET, FILM COATED ORAL
Qty: 19 TABLET | Refills: 0 | Status: SHIPPED | OUTPATIENT
Start: 2021-10-25

## 2021-10-25 RX ORDER — SODIUM CHLORIDE, SODIUM LACTATE, POTASSIUM CHLORIDE, CALCIUM CHLORIDE 600; 310; 30; 20 MG/100ML; MG/100ML; MG/100ML; MG/100ML
INJECTION, SOLUTION INTRAVENOUS CONTINUOUS PRN
Status: DISCONTINUED | OUTPATIENT
Start: 2021-10-25 | End: 2021-10-25 | Stop reason: SDUPTHER

## 2021-10-25 RX ORDER — VECURONIUM BROMIDE 1 MG/ML
10 INJECTION, POWDER, LYOPHILIZED, FOR SOLUTION INTRAVENOUS ONCE
Status: COMPLETED | OUTPATIENT
Start: 2021-10-25 | End: 2021-10-25

## 2021-10-25 RX ORDER — NALOXONE HYDROCHLORIDE 0.4 MG/ML
0.4 INJECTION, SOLUTION INTRAMUSCULAR; INTRAVENOUS; SUBCUTANEOUS ONCE
Status: COMPLETED | OUTPATIENT
Start: 2021-10-25 | End: 2021-10-25

## 2021-10-25 RX ORDER — MIDAZOLAM HYDROCHLORIDE 1 MG/ML
2 INJECTION INTRAMUSCULAR; INTRAVENOUS
Status: DISCONTINUED | OUTPATIENT
Start: 2021-10-25 | End: 2021-10-25 | Stop reason: HOSPADM

## 2021-10-25 RX ORDER — SULFAMETHOXAZOLE AND TRIMETHOPRIM 800; 160 MG/1; MG/1
1 TABLET ORAL 2 TIMES DAILY
Qty: 10 TABLET | Refills: 0 | Status: SHIPPED | OUTPATIENT
Start: 2021-10-25 | End: 2021-10-30

## 2021-10-25 RX ORDER — ONDANSETRON 2 MG/ML
INJECTION INTRAMUSCULAR; INTRAVENOUS PRN
Status: DISCONTINUED | OUTPATIENT
Start: 2021-10-25 | End: 2021-10-25 | Stop reason: SDUPTHER

## 2021-10-25 RX ORDER — DEXAMETHASONE SODIUM PHOSPHATE 10 MG/ML
INJECTION, SOLUTION INTRAMUSCULAR; INTRAVENOUS PRN
Status: DISCONTINUED | OUTPATIENT
Start: 2021-10-25 | End: 2021-10-25 | Stop reason: SDUPTHER

## 2021-10-25 RX ORDER — DEXAMETHASONE SODIUM PHOSPHATE 10 MG/ML
10 INJECTION, SOLUTION INTRAMUSCULAR; INTRAVENOUS ONCE
Status: DISCONTINUED | OUTPATIENT
Start: 2021-10-25 | End: 2021-10-25 | Stop reason: HOSPADM

## 2021-10-25 RX ORDER — OXYCODONE HYDROCHLORIDE 5 MG/1
10 TABLET ORAL
Status: DISCONTINUED | OUTPATIENT
Start: 2021-10-25 | End: 2021-10-25 | Stop reason: HOSPADM

## 2021-10-25 RX ORDER — OXYCODONE HCL 10 MG/1
10 TABLET, FILM COATED, EXTENDED RELEASE ORAL ONCE
Status: COMPLETED | OUTPATIENT
Start: 2021-10-25 | End: 2021-10-25

## 2021-10-25 RX ORDER — PROPOFOL 10 MG/ML
INJECTION, EMULSION INTRAVENOUS
Status: DISCONTINUED
Start: 2021-10-25 | End: 2021-10-26 | Stop reason: HOSPADM

## 2021-10-25 RX ORDER — LIDOCAINE HYDROCHLORIDE 10 MG/ML
1 INJECTION, SOLUTION EPIDURAL; INFILTRATION; INTRACAUDAL; PERINEURAL
Status: DISCONTINUED | OUTPATIENT
Start: 2021-10-25 | End: 2021-10-25 | Stop reason: HOSPADM

## 2021-10-25 RX ORDER — SODIUM CHLORIDE, SODIUM LACTATE, POTASSIUM CHLORIDE, CALCIUM CHLORIDE 600; 310; 30; 20 MG/100ML; MG/100ML; MG/100ML; MG/100ML
INJECTION, SOLUTION INTRAVENOUS CONTINUOUS
Status: DISCONTINUED | OUTPATIENT
Start: 2021-10-25 | End: 2021-10-25 | Stop reason: HOSPADM

## 2021-10-25 RX ORDER — OXYCODONE HYDROCHLORIDE 5 MG/1
20 TABLET ORAL
Status: DISCONTINUED | OUTPATIENT
Start: 2021-10-25 | End: 2021-10-25 | Stop reason: HOSPADM

## 2021-10-25 RX ORDER — OXYCODONE HYDROCHLORIDE 10 MG/1
10 TABLET ORAL SEE ADMIN INSTRUCTIONS
Qty: 90 TABLET | Refills: 0 | Status: SHIPPED | OUTPATIENT
Start: 2021-10-25 | End: 2021-11-25

## 2021-10-25 RX ORDER — FENTANYL CITRATE 50 UG/ML
INJECTION, SOLUTION INTRAMUSCULAR; INTRAVENOUS PRN
Status: DISCONTINUED | OUTPATIENT
Start: 2021-10-25 | End: 2021-10-25 | Stop reason: SDUPTHER

## 2021-10-25 RX ORDER — PREGABALIN 75 MG/1
75 CAPSULE ORAL ONCE
Status: COMPLETED | OUTPATIENT
Start: 2021-10-25 | End: 2021-10-25

## 2021-10-25 RX ORDER — SODIUM CHLORIDE 9 MG/ML
25 INJECTION, SOLUTION INTRAVENOUS PRN
Status: DISCONTINUED | OUTPATIENT
Start: 2021-10-25 | End: 2021-10-25 | Stop reason: HOSPADM

## 2021-10-25 RX ORDER — PROPOFOL 10 MG/ML
INJECTION, EMULSION INTRAVENOUS PRN
Status: DISCONTINUED | OUTPATIENT
Start: 2021-10-25 | End: 2021-10-25 | Stop reason: SDUPTHER

## 2021-10-25 RX ORDER — HYDROMORPHONE HYDROCHLORIDE 1 MG/ML
0.5 INJECTION, SOLUTION INTRAMUSCULAR; INTRAVENOUS; SUBCUTANEOUS EVERY 5 MIN PRN
Status: DISCONTINUED | OUTPATIENT
Start: 2021-10-25 | End: 2021-10-25 | Stop reason: HOSPADM

## 2021-10-25 RX ORDER — LIDOCAINE HYDROCHLORIDE 10 MG/ML
INJECTION, SOLUTION EPIDURAL; INFILTRATION; INTRACAUDAL; PERINEURAL PRN
Status: DISCONTINUED | OUTPATIENT
Start: 2021-10-25 | End: 2021-10-25 | Stop reason: SDUPTHER

## 2021-10-25 RX ADMIN — ROCURONIUM BROMIDE 10 MG: 10 INJECTION, SOLUTION INTRAVENOUS at 10:09

## 2021-10-25 RX ADMIN — DEXAMETHASONE SODIUM PHOSPHATE 10 MG: 10 INJECTION, SOLUTION INTRAMUSCULAR; INTRAVENOUS at 09:12

## 2021-10-25 RX ADMIN — ROCURONIUM BROMIDE 50 MG: 10 INJECTION, SOLUTION INTRAVENOUS at 08:52

## 2021-10-25 RX ADMIN — Medication 2000 MG: at 13:16

## 2021-10-25 RX ADMIN — FENTANYL CITRATE 100 MCG: 50 INJECTION, SOLUTION INTRAMUSCULAR; INTRAVENOUS at 08:52

## 2021-10-25 RX ADMIN — TRANEXAMIC ACID 1000 MG: 1 INJECTION, SOLUTION INTRAVENOUS at 10:50

## 2021-10-25 RX ADMIN — ONDANSETRON HYDROCHLORIDE 4 MG: 2 INJECTION, SOLUTION INTRAMUSCULAR; INTRAVENOUS at 10:56

## 2021-10-25 RX ADMIN — ROCURONIUM BROMIDE 20 MG: 10 INJECTION, SOLUTION INTRAVENOUS at 09:15

## 2021-10-25 RX ADMIN — FENTANYL CITRATE 50 MCG: 50 INJECTION, SOLUTION INTRAMUSCULAR; INTRAVENOUS at 09:52

## 2021-10-25 RX ADMIN — LIDOCAINE HYDROCHLORIDE 50 MG: 10 INJECTION, SOLUTION EPIDURAL; INFILTRATION; INTRACAUDAL; PERINEURAL at 08:52

## 2021-10-25 RX ADMIN — SUGAMMADEX 250 MG: 100 INJECTION, SOLUTION INTRAVENOUS at 10:57

## 2021-10-25 RX ADMIN — SODIUM CHLORIDE, POTASSIUM CHLORIDE, SODIUM LACTATE AND CALCIUM CHLORIDE: 600; 310; 30; 20 INJECTION, SOLUTION INTRAVENOUS at 07:23

## 2021-10-25 RX ADMIN — TRANEXAMIC ACID 1000 MG: 1 INJECTION, SOLUTION INTRAVENOUS at 09:35

## 2021-10-25 RX ADMIN — FENTANYL CITRATE 50 MCG: 50 INJECTION, SOLUTION INTRAMUSCULAR; INTRAVENOUS at 11:04

## 2021-10-25 RX ADMIN — PHENYLEPHRINE HYDROCHLORIDE 100 MCG: 10 INJECTION INTRAVENOUS at 09:26

## 2021-10-25 RX ADMIN — ROCURONIUM BROMIDE 10 MG: 10 INJECTION, SOLUTION INTRAVENOUS at 10:28

## 2021-10-25 RX ADMIN — PHENYLEPHRINE HYDROCHLORIDE 100 MCG: 10 INJECTION INTRAVENOUS at 09:07

## 2021-10-25 RX ADMIN — ONDANSETRON HYDROCHLORIDE 4 MG: 2 SOLUTION INTRAMUSCULAR; INTRAVENOUS at 14:49

## 2021-10-25 RX ADMIN — PROPOFOL 200 MG: 10 INJECTION, EMULSION INTRAVENOUS at 08:52

## 2021-10-25 RX ADMIN — IOPAMIDOL 90 ML: 755 INJECTION, SOLUTION INTRAVENOUS at 19:17

## 2021-10-25 RX ADMIN — OXYCODONE HYDROCHLORIDE 10 MG: 10 TABLET, FILM COATED, EXTENDED RELEASE ORAL at 07:01

## 2021-10-25 RX ADMIN — Medication 2000 MG: at 08:59

## 2021-10-25 RX ADMIN — APREPITANT 40 MG: 40 CAPSULE ORAL at 07:23

## 2021-10-25 RX ADMIN — NALXONE HYDROCHLORIDE 0.4 MG: 0.4 INJECTION INTRAMUSCULAR; INTRAVENOUS; SUBCUTANEOUS at 19:18

## 2021-10-25 RX ADMIN — MIDAZOLAM 2 MG: 1 INJECTION INTRAMUSCULAR; INTRAVENOUS at 08:52

## 2021-10-25 RX ADMIN — ACETAMINOPHEN 1000 MG: 500 TABLET ORAL at 07:01

## 2021-10-25 RX ADMIN — FAMOTIDINE 20 MG: 10 INJECTION, SOLUTION INTRAVENOUS at 08:52

## 2021-10-25 RX ADMIN — SODIUM CHLORIDE, SODIUM LACTATE, POTASSIUM CHLORIDE, AND CALCIUM CHLORIDE: 600; 310; 30; 20 INJECTION, SOLUTION INTRAVENOUS at 08:48

## 2021-10-25 RX ADMIN — SODIUM CHLORIDE, SODIUM LACTATE, POTASSIUM CHLORIDE, AND CALCIUM CHLORIDE: 600; 310; 30; 20 INJECTION, SOLUTION INTRAVENOUS at 10:27

## 2021-10-25 RX ADMIN — PREGABALIN 75 MG: 75 CAPSULE ORAL at 07:01

## 2021-10-25 RX ADMIN — VECURONIUM BROMIDE 10 MG: 1 INJECTION, POWDER, LYOPHILIZED, FOR SOLUTION INTRAVENOUS at 23:56

## 2021-10-25 ASSESSMENT — ENCOUNTER SYMPTOMS
SORE THROAT: 0
ABDOMINAL PAIN: 0
BACK PAIN: 0
SHORTNESS OF BREATH: 0
DIARRHEA: 0
RHINORRHEA: 0
NAUSEA: 0
VOMITING: 0

## 2021-10-25 ASSESSMENT — PAIN SCALES - GENERAL
PAINLEVEL_OUTOF10: 3
PAINLEVEL_OUTOF10: 3
PAINLEVEL_OUTOF10: 4

## 2021-10-25 ASSESSMENT — PULMONARY FUNCTION TESTS: PIF_VALUE: 23

## 2021-10-25 ASSESSMENT — PAIN DESCRIPTION - PAIN TYPE
TYPE: SURGICAL PAIN
TYPE: SURGICAL PAIN

## 2021-10-25 ASSESSMENT — PAIN DESCRIPTION - LOCATION
LOCATION: HIP
LOCATION: HIP

## 2021-10-25 ASSESSMENT — LIFESTYLE VARIABLES: SMOKING_STATUS: 0

## 2021-10-25 ASSESSMENT — PAIN DESCRIPTION - ORIENTATION
ORIENTATION: RIGHT
ORIENTATION: RIGHT

## 2021-10-25 ASSESSMENT — PAIN - FUNCTIONAL ASSESSMENT: PAIN_FUNCTIONAL_ASSESSMENT: 0-10

## 2021-10-25 NOTE — PROGRESS NOTES
Patient discharged home today with [facility] 9. Went over all discharge instructions and new medications with patient's wife and provided a copy of all new medications to take home. Patient's wife verbalized understanding. Patient's stability will be assessed by PT and Op Care RN before discharging home.   Electronically signed by Jb Arenas RN on 10/25/2021 at 10:31 AM

## 2021-10-25 NOTE — H&P
I have reviewed the history and physical for planned procedure. I have re-examined the patient and there are no changes to the history and physical unless noted below.     Electronically signed by James Vanegas MD on 10/25/2021 at 7:16 AM

## 2021-10-25 NOTE — PROGRESS NOTES
[facility] PHYSICAL THERAPY EVALUATION      Maryanne Franco    : 1960  MRN: [id number]   PHYSICIAN:  Janet Fan MD  Primary Problem    Patient Active Problem List   Diagnosis    Chronic kidney disease, stage II (mild)    GERD (gastroesophageal reflux disease)    Hyperlipidemia    Arthritis    Acute hypoxemic respiratory failure (HCC)    Acute hyponatremia    Hypochloremia    Leukocytosis (leucocytosis)    Hypoxia    Tachycardia    Testosterone deficiency    Mixed hyperlipidemia       Rehabilitation Diagnosis:  R THR   M16.11       SERVICE DATE: 10/25/2021        SUBJECTIVE: Patient agrees that they are safe with mobility and do not require physical therapy services in this setting. OBJECTIVE:    Orientation is Within normal limits       PAIN: Pt reports 6/10 with ambulation    ACTIVE ROM:       All major lower extremity joints are within functional limits  R hip dec overall due to discomfort      STRENGTH     Both lower extremities are grossly within functional limits. R hip <+3/5        TRANSFERS   Sit to stand   SB-CGA     Bed to chair   SB-CGA     Bed to mobility   Supine to sit   Independent       Roll     Independent     Scoot Side to side / Up and down   Independent    AMBULATION   Distance: 25'     Device: RW     Assistance: SB-CGA       Comment: step-to pattern, sufficient weightbearing, slightly flexed posture    BALANCE   Sitting    Good     Standing    Good (-)    ASSESSMENT      Safe with all functional mobility for returning home with spouse.        PLAN: Discharge from skilled physical therapy      GOALS   Independent and safe with all functional mobility (MET)            Electronically signed by Hugo Solorzano PT on 10/25/2021 at 2:35 PM

## 2021-10-25 NOTE — OP NOTE
[facility]                                OPERATIVE REPORT    PATIENT NAME: Jose Enrique Martinez                       :        1960  MED REC NO:   [id number]                              ROOM:  ACCOUNT NO:   [de-identified]                           ADMIT DATE: 10/25/2021  PROVIDER:     Dayana Manning MD    DATE OF PROCEDURE:  10/25/2021    PREOPERATIVE DIAGNOSIS:  Primary osteoarthritis, right hip. POSTOPERATIVE DIAGNOSIS:  Primary osteoarthritis, right hip. PROCEDURES:  1. Right total hip arthroplasty. 2.  Use of computer navigation for assessment of leg length and  component placement. Surgical code 1332A. SURGEON:  Dayana Manning MD    FIRST ASSISTANT:  Susu Hill PA-C. Please note, he is a critical  assistant in exposure and placement of implants. ANESTHESIA:  General.    EBL:  950 mL. FLUIDS:  1300 mL of crystalloid. URINE OUTPUT:  100 mL. COMPONENTS USED:  DePuy hip system, acetabular shell size 52 GRIPTION  PINNACLE shell with 25 mm screw, liner is a 36 mm polyethylene liner,  stem is a size 7 high-offset ACTIS stem, head is a 36 mm +5 ceramic  head. INDICATIONS:  This is a 61-year-old gentleman with severe arthritis of  the right hip, failing conservative care. Because of this, he elected  for the above procedure. OPERATIVE PROCEDURE:  After informed consent, he was given 2 gm of  Ancef, 1 gm of tranexamic acid, underwent general anesthesia. He was  placed on the Limon table. Acuna catheter was inserted. A combined  20-degree internal rotation view was taken of the right hip. The right  hip was then prepped and draped in the usual sterile fashion. A 10 cm  incision was made lateral to the ASIS and extending distally after  incising through the skin. TFL fascia was incised. TFL was taken  laterally, sartorius and rectus medially.   Ascending branch of the  lateral femoral circumflex vessels were identified and cauterized. Anterior capsulotomy was performed. Neck resection was made at the  saddle of the neck and equator of the femoral head. Neck fragment and  femoral head were removed. We then exposed the proximal femur using our  trochanteric elevator. The leg was externally rotated and extended. This sequence was repeated 3 times as the patient had very muscular  size. We then used curved retractors medially and posteriorly. We  broached up using our ACTIS system to a size 6 broach and used our  calcar planer. This was removed. The acetabulum was then exposed. The  patient had large redundant osteophytes anteriorly and medially. We  stared reaming with a 47 mm reamer and reamed up to a 51 mm reamer. The  trial 51 shell was placed and using the template, we removed the large  osteophytes anteriorly, inferiorly and medially. Following this, we  touch reamed with 51 mm reamer again. This filled the acetabulum  nicely. We irrigated with 1 liter of irrigation. The final size 52  GRIPTION PINNACLE shell was press-fit in about 40 degrees of abduction  and 20 degrees of anteversion and had excellent purchase. We drilled,  measured, and placed a 25 mm screw which also had an excellent purchase. The final 36 liner was locked in the acetabular shell. Several trial  reductions were done and our navigation system was used. This revealed  that we indeed needed a high-offset stem. We could size up by one size. We sized up to a size 7 broach using our calcar planer. The final size  7 high-offset ACTIS stem was press-fit down the canal and had excellent  purchase. The final 36 mm +5 ceramic head was placed onto the Michelle Buckle  taper. The hip was reduced. We had excellent stability to anterior  maneuvers including hyperextension with excellent rotation and adduction  and no evidence of any anterior instability at all.   Fluoroscopic views  revealed excellent alignment of the femoral and acetabular components. Our final navigation numbers revealed that we lengthened the leg by 2  mm, added 3 mm to the total offset,and 8 mm to the femoral offset which  was well within our templating goals. We irrigated again with a total  of 2 liters of irrigation. We mixed 20 mL of Exparel with 30 mL of  saline and 50 mL of 0.25% Marcaine. We injected the TFL and rectus  muscles with this solution. TFL fascia was closed with 0 Vicryl suture,  2-0 Vicryl suture for subcutaneous tissues, and 3-0 Vicryl for  subcuticular stitch. Prineo Dermabond and soft dressings were applied. The patient was taken to the recovery room in stable condition. POSTOPERATIVE PLANS:  1. The patient will be on typical outpatient protocol. 2.  He will be on Bactrim DS one p.o. b.i.d. for 5 days. 3.  He will be on Xarelto 10 mg a day for 21 days followed by baby  aspirin 81 mg twice a day for another 3 weeks.         Whitley Myers MD    D: 10/25/2021 12:19:28      T: 10/25/2021 13:03:42     [email or web address]  Job#: [id number]     Doc#: [id number]    CC:

## 2021-10-25 NOTE — ANESTHESIA POSTPROCEDURE EVALUATION
Department of Anesthesiology  Postprocedure Note    Patient: Patti Mendenhall  MRN: [id number]  YOB: 1960  Date of evaluation: 10/25/2021  Time:  11:28 AM     Procedure Summary     Date: 10/25/21 Room / Location: [address]    Anesthesia Start: Marshel Abo Anesthesia Stop: 1354    Procedure: RIGHT TOTAL HIP ARTHROPLASTY (Right ) Diagnosis: (M16.11)    Surgeons: Jinny Collier MD Responsible Provider: Fredrick Jimenez MD    Anesthesia Type: general ASA Status: 2          Anesthesia Type: general    Geovanni Phase I:      Geovanni Phase II:      Last vitals: Reviewed and per EMR flowsheets.        Anesthesia Post Evaluation    Patient location during evaluation: PACU  Patient participation: complete - patient participated  Level of consciousness: awake and alert  Pain score: 0  Airway patency: patent  Nausea & Vomiting: no nausea and no vomiting  Complications: no  Cardiovascular status: blood pressure returned to baseline and hemodynamically stable  Respiratory status: room air and acceptable  Hydration status: euvolemic

## 2021-10-25 NOTE — ANESTHESIA PRE PROCEDURE
outpatient medications on file. Facility-Administered Medications Ordered in Other Visits   Medication Dose Route Frequency Provider Last Rate Last Admin    ceFAZolin (ANCEF) 2000 mg in 0.9% sodium chloride 50 mL IVPB  2,000 mg IntraVENous Once Christopher Aguilar MD        dexamethasone (PF) (DECADRON) injection 10 mg  10 mg IntraVENous Once Christopher Aguilar MD           Allergies:     Allergies   Allergen Reactions    Latex Rash    Imodium [Loperamide] Hives and Itching    Minocycline Rash       Problem List:    Patient Active Problem List   Diagnosis Code    Chronic kidney disease, stage II (mild) N18.2    GERD (gastroesophageal reflux disease) K21.9    Hyperlipidemia E78.5    Arthritis M19.90    Acute hypoxemic respiratory failure (HCC) J96.01    Acute hyponatremia E87.1    Hypochloremia E87.8    Leukocytosis (leucocytosis) D72.829    Hypoxia R09.02    Tachycardia R00.0    Testosterone deficiency E34.9    Mixed hyperlipidemia E78.2       Past Medical History:        Diagnosis Date    Congenital absence of one kidney     ED (erectile dysfunction)     GERD (gastroesophageal reflux disease)     hx of    Gout     Hip pain     Tribe (hard of hearing)     Hyperlipidemia     mild    Osteoarthritis     PONV (postoperative nausea and vomiting)        Past Surgical History:        Procedure Laterality Date    CARPAL TUNNEL RELEASE Bilateral      X 2 ON EACH SIDE    JOINT REPLACEMENT  01/31/2020    Left knee Dr. Kasie Gonzalez ARTHROSCOPY Left 4/18/2016    KNEE ARTHROSCOPY WITH TIBIAL AND FEMORAL SUBCHONDROPLASTY performed [facility] at [facility] Bilateral     MA KNEE [address] Right 10/18/2017    KNEE ARTHROSCOPY FSAI performed by Christopher Aguilar MD at [place] [address] Bilateral        Social History:    Social History     Tobacco Use    Smoking status: Never Smoker    Smokeless tobacco: Never Used   Substance Use Topics    Alcohol use: Yes     Alcohol/week: 1.0 standard drinks     Types: 1 Cans of beer per week     Comment: [address]                                Counseling given: Not Answered      Vital Signs (Current): There were no vitals filed for this visit. BP Readings from Last 3 Encounters:   10/15/21 136/74   05/05/21 136/72   07/26/19 124/72       NPO Status:                                                                                 BMI:   Wt Readings from Last 3 Encounters:   10/15/21 268 lb (121.6 kg)   10/08/21 264 lb (119.7 kg)   05/05/21 265 lb 3.2 oz (120.3 kg)     There is no height or weight on file to calculate BMI.    CBC:   Lab Results   Component Value Date    WBC 6.4 10/08/2021    RBC 5.30 10/08/2021    HGB 15.1 10/08/2021    HCT 47.8 10/08/2021    MCV 90.2 10/08/2021    RDW 13.1 10/08/2021     10/08/2021       CMP:   Lab Results   Component Value Date     10/08/2021     07/06/2011    K 4.1 10/08/2021    K 4.9 07/06/2011     10/08/2021     07/06/2011    CO2 22 10/08/2021    BUN 14 10/08/2021    CREATININE 0.7 10/08/2021    CREATININE 0.9 07/06/2011    GFRAA >59 10/08/2021    LABGLOM >60 10/08/2021    GLUCOSE 101 10/08/2021    PROT 7.4 10/08/2021    CALCIUM 9.4 10/08/2021    BILITOT 0.4 10/08/2021    ALKPHOS 77 10/08/2021    AST 52 10/08/2021    ALT 59 10/08/2021       POC Tests: No results for input(s): POCGLU, POCNA, POCK, POCCL, POCBUN, POCHEMO, POCHCT in the last 72 hours.     Coags:   Lab Results   Component Value Date    PROTIME 13.2 10/08/2021    INR 0.98 10/08/2021    APTT 27.7 10/08/2021       HCG (If Applicable): No results found for: PREGTESTUR, PREGSERUM, HCG, HCGQUANT     ABGs:   Lab Results   Component Value Date    PHART 7.390 04/18/2016    PO2ART 51.0 04/18/2016    FPL3KRZ 36.0 04/18/2016    NNG1MAT 21.8 04/18/2016    BEART -2.6 04/18/2016    H6NNCUFD 88.7 04/18/2016        Type & Screen (If Applicable):  No results found for: LABABO, [facility]    Anesthesia Evaluation  Patient summary reviewed and Nursing notes reviewed   history of anesthetic complications: PONV. Airway: Mallampati: II  TM distance: >3 FB   Neck ROM: full  Mouth opening: > = 3 FB Dental:          Pulmonary:Negative Pulmonary ROS and normal exam        (-) COPD, asthma, sleep apnea and not a current smoker          Patient did not smoke on day of surgery. Cardiovascular:Negative CV ROS    (+) hyperlipidemia    (-) hypertension, past MI, CAD and CABG/stent    ECG reviewed               Beta Blocker:  Not on Beta Blocker         Neuro/Psych:   Negative Neuro/Psych ROS     (-) seizures and CVA           GI/Hepatic/Renal: Neg GI/Hepatic/Renal ROS  (+) GERD: well controlled,      (-) liver disease and no renal disease       Endo/Other:    (+) : arthritis: OA., .    (-) diabetes mellitus               Abdominal:             Vascular: Other Findings:               Anesthesia Plan      general     ASA 2     (Scop, emend    Decadron/Zofran Intraop)  Induction: intravenous. MIPS: Prophylactic antiemetics administered. Anesthetic plan and risks discussed with patient and spouse.                       Ramey Riedel, MD   10/25/2021

## 2021-10-25 NOTE — BRIEF OP NOTE
Brief Postoperative Note      Patient: Rachid Hurst  YOB: 1960  MRN: [id number]    Date of Procedure: 10/25/2021    Pre-Op Diagnosis: M16.11    Post-Op Diagnosis: Same       Procedure(s):  RIGHT TOTAL HIP ARTHROPLASTY    Surgeon(s):  Juan Antonio Morton MD    Assistant:  First Assistant: Donaldo Whitaker; Ellyn Macedo PA-C    Anesthesia: Choice    Estimated Blood Loss (mL): 439XO    Complications: None    Specimens:   * No specimens in log *    Implants:  Implant Name Type Inv.  Item Serial No.  Lot No. LRB No. Used Action   CUP ACET [id number] 10 H HIP TI GRIPTION MULT H II VIP TAPR  CUP ACET [id number] 10 H HIP TI GRIPTION MULT H II VIP TAPR  [facility] [id number] Right 1 Implanted   SCREW BNE L25MM DIA6.5MM CANC HIP S STL GRIPTION FULL THRD  SCREW BNE L25MM DIA6.5MM CANC HIP S STL GRIPTION FULL THRD  [facility] [place] [id number] Right 1 Implanted   LINER ACET OD52MM ID36MM HIP ALTRX PINN  LINER ACET OD52MM ID36MM HIP ALTRX PINN  [facility] [place] [id number] Right 1 Implanted   STEM FEM SZ 7 HIP HI OFFSET CLLRD CEMENTLESS 12/14 TAPR  STEM FEM SZ 7 HIP HI OFFSET CLLRD CEMENTLESS 12/14 TAPR  [facility] [place] [id number] Right 1 Implanted   HEAD FEM [id number] +5MM OFFSET 12/14 TAPR HIP CERAMIC BIOLOX  HEAD FEM [id number] +5MM OFFSET 12/14 TAPR HIP CERAMIC BIOLOX  [facility] [id number] Right 1 Implanted         Drains:   Urethral Catheter Double-lumen 16 fr (Active)       Findings:     Electronically signed by Juan Antonio Morton MD on 10/25/2021 at 11:05 AM

## 2021-10-25 NOTE — PROGRESS NOTES
CLINICAL PHARMACY NOTE: MEDS TO BEDS    Total # of Prescriptions Filled: 5   The following medications were delivered to the patient:  · Aspirin 325mg  · Ondansetron 4mg  · Sulfamethoxazole-trime 800-160mg  · Oxycodone 10mg  · xarelto 10mg    Additional Documentation:  The patients wife paid with a credit card on the [employer] machine and was handed the medications in the patients room in [facility].

## 2021-10-25 NOTE — CARE COORDINATION
referral received. Per Jesenia Kelloggator, patient agreeable and has chosen [facility]. Referral Faxed. [facility] [phone]. [phone]. Please notify [facility] when patient discharges and fax DC Summary,  DC med list and any new [facility] orders. The Patient and/or patient representative was provided with a choice of provider and agrees   with the discharge plan. [x] Yes [] No    Freedom of choice list was provided with basic dialogue that supports the patient's individualized plan of care/goals, treatment preferences and shares the quality data associated with the providers.  [x] Yes [] No  Electronically signed by Adonis Green on 10/25/2021 at 9:28 AM

## 2021-10-26 ENCOUNTER — APPOINTMENT (OUTPATIENT)
Dept: CT IMAGING | Age: 61
End: 2021-10-26
Payer: COMMERCIAL

## 2021-10-26 ENCOUNTER — APPOINTMENT (OUTPATIENT)
Dept: GENERAL RADIOLOGY | Age: 61
End: 2021-10-26
Payer: COMMERCIAL

## 2021-10-26 VITALS
TEMPERATURE: 98.5 F | OXYGEN SATURATION: 96 % | SYSTOLIC BLOOD PRESSURE: 160 MMHG | RESPIRATION RATE: 28 BRPM | HEART RATE: 120 BPM | DIASTOLIC BLOOD PRESSURE: 81 MMHG

## 2021-10-26 LAB
EKG P AXIS: 78 DEGREES
EKG P-R INTERVAL: 186 MS
EKG Q-T INTERVAL: 338 MS
EKG QRS DURATION: 96 MS
EKG QTC CALCULATION (BAZETT): 420 MS
EKG T AXIS: 31 DEGREES

## 2021-10-26 PROCEDURE — 82947 ASSAY GLUCOSE BLOOD QUANT: CPT

## 2021-10-26 PROCEDURE — 6360000004 HC RX CONTRAST MEDICATION: Performed by: EMERGENCY MEDICINE

## 2021-10-26 PROCEDURE — 93010 ELECTROCARDIOGRAM REPORT: CPT | Performed by: INTERNAL MEDICINE

## 2021-10-26 PROCEDURE — 70450 CT HEAD/BRAIN W/O DYE: CPT

## 2021-10-26 PROCEDURE — 71045 X-RAY EXAM CHEST 1 VIEW: CPT

## 2021-10-26 PROCEDURE — 71275 CT ANGIOGRAPHY CHEST: CPT

## 2021-10-26 RX ADMIN — IOPAMIDOL 90 ML: 755 INJECTION, SOLUTION INTRAVENOUS at 00:23

## 2021-10-26 NOTE — ED NOTES
Updated patient wife concerning patient intubated status.        Meredith Slaughter RN  10/26/21 0002

## 2021-10-26 NOTE — ED NOTES
Dr. Padmini Rushing called again at [phone].   Transferred call to Dr. Samm Kidd  10/25/21 [phone]

## 2021-10-26 NOTE — ED PROVIDER NOTES
[facility] EMERGENCY DEPT  eMERGENCYdEPARTMENT eNCOUnter      Pt Name: Phillip Khan  MRN: [id number]  [place] 1960  Date of evaluation: 10/25/2021  Jacquelyn Velez MD    Emergency Department care of this patient was assumed at 2300 from Dr. Taryn Fields. We have discussed the case and the plan of care. I have seen and evaluated patient and reviewed ED course. Patient currently awaiting transfer to the [facility] for definitive management regarding possible thrombectomy. CHIEF COMPLAINT       Chief Complaint   Patient presents with    Extremity Weakness     l facial droop-slurred speech-vision issues started post op         PHYSICAL EXAM    (up to 7 for level 4, 8 or more for level 5)     ED Triage Vitals [10/25/21 1915]   BP Temp Temp Source Pulse Resp SpO2 Height Weight   138/77 98.5 °F (36.9 °C) Axillary 103 14 97 % -- --       Physical Exam    DIAGNOSTIC RESULTS     EKG: All EKG's are interpreted by the Emergency Department Physician who either signs or Co-signs this chart inthe absence of a cardiologist.      RADIOLOGY:   Non-plain film imagessuch as CT, Ultrasound and MRI are read by the radiologist. Plain radiographic images are visualized and preliminarily interpreted by the emergency physician with the below findings:        XR CHEST PORTABLE   Final Result   Shallow inspiration with diminished lung volumes. No acute   cardiopulmonary process. Signed by Dr Muna Alamo   Final Result   1. 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 2. 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 3. Calcified atheromatous plaque involving the bifurcations   particularly on the right without hemodynamically significant   stenosis. 4. Very small dissection flap in the posterior inferior aortic arch. Signed by Dr Jonny Meneses   Final Result   1.  Occluded distal left vertebral artery with thrombus extending into   the proximal basilar artery. Vascular Compromise of the left PICA not   excluded. 2. Otherwise negative CT angiography of the head. Signed by Dr Abbie Nicholson   Final Result   Impression:    1. Dense left vertebral and proximal basilar artery most suspicious   for acute thrombus. 2. No intracranial hemorrhage. Signed by Dr Abbie Nicholson    (Results Pending)   CTA CHEST W WO CONTRAST    (Results Pending)   XR CHEST PORTABLE    (Results Pending)           LABS:  Labs Reviewed   APTT - Abnormal; Notable for the following components:       Result Value    aPTT 24.2 (*)     All other components within normal limits   CBC WITH AUTO DIFFERENTIAL - Abnormal; Notable for the following components:    WBC 14.5 (*)     RBC 4.66 (*)     Hemoglobin 13.5 (*)     MCHC 31.0 (*)     Neutrophils % 82.5 (*)     Lymphocytes % 13.7 (*)     Neutrophils Absolute 12.0 (*)     All other components within normal limits   COMPREHENSIVE METABOLIC PANEL - Abnormal; Notable for the following components:    CO2 13 (*)     Anion Gap 24 (*)     Glucose 238 (*)     Calcium 8.3 (*)     ALT 54 (*)     AST 52 (*)     All other components within normal limits   PROTIME-INR - Abnormal; Notable for the following components:    Protime 15.6 (*)     INR 1.23 (*)     All other components within normal limits   POCT GLUCOSE - Abnormal; Notable for the following components:    POC Glucose 205 (*)     All other components within normal limits   POCT GLUCOSE - Abnormal; Notable for the following components:    POC Glucose 173 (*)     All other components within normal limits   COVID-19, RAPID   TROPONIN   BRAIN NATRIURETIC PEPTIDE       All other labs were within normal range or not returned as of this dictation.     EMERGENCY DEPARTMENT COURSE and DIFFERENTIAL DIAGNOSIS/MDM:   Vitals:    Vitals:    10/25/21 2130 10/25/21 2200 10/25/21 2344 10/25/21 2350   BP: (!) 153/95 (!) 149/90 (!) 78/44 (!) 142/100   Pulse: 127 113 116 110   Resp: 18 18 28 28   Temp:       TempSrc:       SpO2: 94% 92% 94% 100%       MDM    Reassessment    I was called the patient bedside at approximately 2345. Patient had developed acute altered mental status, tachypnea along with hypotension. On my arrival patient was altered however would follow some basic commands. He had a rapid respiratory rate in the 30s along with shallow respiratory effort. Patient was initially noted to be hypotensive with blood pressure in the 70s. He was given a 1 L IV fluid bolus. After about 250 cc had infused his pressure had already rebounded into the 085G systolic. Decision made to proceed with definitive airway management given rapid progression of symptoms and downhill decline related to his respiratory status. Patient was intubated without difficulty. He was taken emergently to CT scan where he underwent a CT scan of the brain and CT angiogram of the chest.    CT scan of the brain does not reveal any acute intracranial hemorrhage. Suspect that he is likely experiencing an extension of his basilar artery thrombus. CT scan of the chest does not reveal progression of the dissection. Case has been reviewed with Dr. Francois Trimble regarding neurological change in status. Was in agreement with continued plan of care regarding need for transfer to tertiary center for thrombectomy. We discussed patient's blood pressure, currently running in the 436B systolic. Recommended to allow patient to remain hypertensive in the setting of an acute CVA. CONSULTS:  IP CONSULT TO NEUROLOGY    PROCEDURES:  Unless otherwise noted below, none     Intubation    Date/Time: 10/26/2021 12:20 AM  Performed by: Sury Escalante MD  Authorized by: Sury Escalante MD     Consent:     Consent obtained:  Emergent situation  Pre-procedure details:     Patient status:  Altered mental status    Pretreatment meds: Etomidate. Paralytics:  Succinylcholine  Procedure details:     Preoxygenation:  Bag valve mask    Intubation method:  Oral    Oral intubation technique:  Direct    Laryngoscope blade: Mac 4    Tube type:  Cuffed    Number of attempts:  1  Placement assessment:     ETT to lip:  23    Tube secured with:  ETT cruz    Breath sounds:  Equal    Placement verification: condensation, CXR verification, direct visualization, equal breath sounds and ETCO2 detector      CXR findings:  ETT in proper place  Post-procedure details:     Patient tolerance of procedure: Tolerated well, no immediate complications        FINAL IMPRESSION      1. Cerebrovascular accident (CVA), unspecified mechanism ([address]    2.  Dissection of thoracic aorta [facility])          DISPOSITION/PLAN   DISPOSITION Decision To Transfer    (Please note that portions ofthis note were completed with a voice recognition program.  Efforts were made to edit the dictations but occasionally words are mis-transcribed.)    Roseanna Jean-Baptiste MD(electronically signed)  Attending Emergency Physician         Roseanna Jean-Baptiste MD  11/11/21 0758

## 2021-10-26 NOTE — ED PROVIDER NOTES
[facility] EMERGENCY DEPT  eMERGENCY dEPARTMENT eNCOUnter      Pt Name: Oletta Rinne  MRN: [id number]  [place] 1960  Date of evaluation: 10/25/2021  Provider: Guanaco Rodriguez MD    [address] [place] [address]       Chief Complaint   Patient presents with    Extremity Weakness     l facial droop-slurred speech-vision issues started post op         HISTORY OF PRESENT ILLNESS   (Location/Symptom, Timing/Onset,Context/Setting, Quality, Duration, Modifying Factors, Severity)  Note limiting factors. Oletta Rinne is a 59 y.o. male who presents to the emergency department with strokelike symptoms. He has facial droop, slurred speech, visual field deficit as well as weakness. The patient had right total hip surgery today with Dr. Danuta Padron. Dr. Danuta Padron says that the patient had reported some change in vision postop and he was not notified. This was attributed to anesthesia and the patient was discharged home. At 7 PM the patient started having a facial droop and weakness. He had been given some oxycodone sometime prior. His wife thought he was having a stroke so she gave him a blood thinner, the Xarelto he had been prescribed postop. HPI    NursingNotes were reviewed. REVIEW OF SYSTEMS    (2-9 systems for level 4, 10 or more for level 5)     Review of Systems   Constitutional: Negative for chills and fever. HENT: Negative for rhinorrhea and sore throat. Eyes: Positive for visual disturbance. Respiratory: Negative for shortness of breath. Cardiovascular: Negative for chest pain and leg swelling. Gastrointestinal: Negative for abdominal pain, diarrhea, nausea and vomiting. Genitourinary: Negative for difficulty urinating. Musculoskeletal: Negative for back pain and neck pain. Skin: Negative for rash. Neurological: Positive for speech difficulty, weakness and numbness. Negative for headaches. Psychiatric/Behavioral: Negative for confusion.        A complete review of systems was performed and is negative except as noted above in the HPI. PAST MEDICAL HISTORY     Past Medical History:   Diagnosis Date    Congenital absence of one kidney     ED (erectile dysfunction)     GERD (gastroesophageal reflux disease)     hx of    Gout     Hip pain     [place] (hard of hearing)     Hyperlipidemia     mild    Osteoarthritis     PONV (postoperative nausea and vomiting)          SURGICAL HISTORY       Past Surgical History:   Procedure Laterality Date    CARPAL TUNNEL RELEASE Bilateral      X 2 ON EACH SIDE    JOINT REPLACEMENT  01/31/2020    Left knee Dr. Parul Montana ARTHROSCOPY Left 4/18/2016    KNEE ARTHROSCOPY WITH TIBIAL AND FEMORAL SUBCHONDROPLASTY performed by Mahendra Montesinos MD at [facility] Bilateral     AR KNEE [address] Right 10/18/2017    KNEE ARTHROSCOPY FSAI performed by Mahendra Montesinos MD at [address] [place] [address] Bilateral          CURRENT MEDICATIONS       Previous Medications    ALLOPURINOL (ZYLOPRIM) 100 MG TABLET    Take 100 mg by mouth daily as needed    ASCORBIC ACID (VITAMIN C) 500 MG TABLET    Take 1,000 mg by mouth daily    CELECOXIB (CELEBREX) 200 MG CAPSULE    Take 1 capsule by mouth daily    CETIRIZINE (ZYRTEC) 10 MG TABLET    Take 1 tablet by mouth daily    MONTELUKAST (SINGULAIR) 10 MG TABLET    TAKE 1 TABLET DAILY    OMEGA-3 ACID ETHYL ESTERS (LOVAZA) 1 G CAPSULE    Take 1 capsule by mouth daily    ONDANSETRON (ZOFRAN) 4 MG TABLET    Take 1 tablet by mouth every 8 hours as needed for Nausea or Vomiting    OXYCODONE HCL (OXY-IR) 10 MG IMMEDIATE RELEASE TABLET    Take 1 tablet by mouth See Admin Instructions for 31 days. 1 to 2 tablets po q 3 to 4 hours prn    PSEUDOEPHEDRINE (SUDAFED) 30 MG TABLET    Take 30 mg by mouth every 4 hours as needed for Congestion    RIVAROXABAN (XARELTO) 10 MG TABS TABLET    One tablet PO QD x 19 days. After this is completed: Take EC-ASA 325mg po BID x 3 weeks.  (Only if you can tolerate aspirin.) Then resume previous aspirin regimen or discontinue if not previously on aspirin. SULFAMETHOXAZOLE-TRIMETHOPRIM (BACTRIM DS;SEPTRA DS) 800-160 MG PER TABLET    Take 1 tablet by mouth 2 times daily for 5 days    TADALAFIL (CIALIS) 20 MG TABLET    Take 1 tablet by mouth as needed for Erectile Dysfunction    TURMERIC (QC TUMERIC COMPLEX PO)    Take 1 each by mouth 2 times daily    VITAMIN D 25 MCG (1000 UT) CAPS    Take 1 capsule by mouth daily    ZINC 50 MG CAPS    Take 1 capsule by mouth daily       ALLERGIES     Latex, Imodium [loperamide], and Minocycline    FAMILY HISTORY       Family History   Problem Relation Age of Onset    High Blood Pressure Mother     Cancer Mother     Heart Attack Father 61    Other Sister     High Cholesterol Brother     High Cholesterol Brother           SOCIAL HISTORY       Social History     Socioeconomic History    Marital status:      Spouse name: Not on file    Number of children: Not on file    Years of education: Not on file    Highest education level: Not on file   Occupational History    Not on file   Tobacco Use    Smoking status: Never Smoker    Smokeless tobacco: Never Used   Vaping Use    Vaping Use: Never used   Substance and Sexual Activity    Alcohol use: Yes     Alcohol/week: 1.0 standard drinks     Types: 1 Cans of beer per week     Comment: OCC    Drug use: Not on file    Sexual activity: Not on file   Other Topics Concern    Not on file   Social History Narrative    Not on file     Social Determinants of Health     Financial Resource Strain:     Difficulty of Paying Living Expenses:    Food Insecurity:     Worried About Running Out of Food in the Last Year:     [address] in the Last Year:    Transportation Needs:     Lack of Transportation (Medical):      Lack of Transportation (Non-Medical):    Physical Activity:     Days of Exercise per Week:     Minutes of Exercise per Session:    Stress:     Feeling of Stress :    Social Connections:     Frequency of Breath sounds: Normal breath sounds. Abdominal:      General: Bowel sounds are normal. There is no distension. Palpations: Abdomen is soft. Tenderness: There is no abdominal tenderness. Musculoskeletal:         General: Normal range of motion. Cervical back: Normal range of motion and neck supple. Skin:     General: Skin is warm and dry. Findings: No rash. Neurological:      Mental Status: He is oriented to person, place, and time. Cranial Nerves: Cranial nerve deficit present. Sensory: Sensory deficit present. Motor: Weakness present. No abnormal muscle tone. Coordination: Coordination abnormal.   Psychiatric:         Behavior: Behavior normal.         DIAGNOSTIC RESULTS     EKG: All EKG's are interpreted by the Emergency Department Physician who either signs or Co-signs this chart in the absence of a cardiologist.    Sinus tachycardia rate 107 nonspecific ST waves    RADIOLOGY:   Non-plain film images such as CT, Ultrasound and MRI are read by the radiologist. Graciella Brooking images are visualized and preliminarily interpreted by the emergency physician with the below findings:        Interpretation per the Radiologist below, if available at the time of this note:    XR CHEST PORTABLE   Final Result   Shallow inspiration with diminished lung volumes. No acute   cardiopulmonary process. Signed by Dr Yani Hall   Final Result   1. 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 2. 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 3. Calcified atheromatous plaque involving the bifurcations   particularly on the right without hemodynamically significant   stenosis. 4. Very small dissection flap in the posterior inferior aortic arch. Signed by Dr Felipa Hancock   Final Result   1.  Occluded distal left vertebral artery with thrombus extending into   the proximal basilar artery. Vascular Compromise of the left PICA not   excluded. 2. Otherwise negative CT angiography of the head. Signed by Dr Adalid Chavis   Final Result   Impression:    1. Dense left vertebral and proximal basilar artery most suspicious   for acute thrombus. 2. No intracranial hemorrhage. Signed by Dr Jelena Meehan            ED BEDSIDE ULTRASOUND:   Performed by ED Physician - none    LABS:  Labs Reviewed   APTT - Abnormal; Notable for the following components:       Result Value    aPTT 24.2 (*)     All other components within normal limits   CBC WITH AUTO DIFFERENTIAL - Abnormal; Notable for the following components:    WBC 14.5 (*)     RBC 4.66 (*)     Hemoglobin 13.5 (*)     MCHC 31.0 (*)     Neutrophils % 82.5 (*)     Lymphocytes % 13.7 (*)     Neutrophils Absolute 12.0 (*)     All other components within normal limits   COMPREHENSIVE METABOLIC PANEL - Abnormal; Notable for the following components:    CO2 13 (*)     Anion Gap 24 (*)     Glucose 238 (*)     Calcium 8.3 (*)     ALT 54 (*)     AST 52 (*)     All other components within normal limits   PROTIME-INR - Abnormal; Notable for the following components:    Protime 15.6 (*)     INR 1.23 (*)     All other components within normal limits   POCT GLUCOSE - Abnormal; Notable for the following components:    POC Glucose 205 (*)     All other components within normal limits   COVID-19, RAPID   TROPONIN   BRAIN NATRIURETIC PEPTIDE       All other labs were within normal range or not returned as of this dictation.     EMERGENCY DEPARTMENT COURSE and DIFFERENTIALDIAGNOSIS/MDM:   Vitals:    Vitals:    10/25/21 2030 10/25/21 2100 10/25/21 2130 10/25/21 2200   BP: (!) 140/88 (!) 141/85 (!) 153/95 (!) 149/90   Pulse: 104 107 127 113   Resp: 18 18 18 18   Temp:       TempSrc:       SpO2: 94% 93% 94% 92%       MDM  D/w Dr Mikel Sanchez with [facility] at [facility] and pt would potentially be a thrombectomy candidate since he is not a TPA candidate. D/w transfer center and they will call back after discussing with . [facility] declined due to capacity  D/w Dr Dyana Paez at [facility], who d/w his interventional neurosurgeon and he states pt too complicated and needs to go to [address] declined due to capacity  [place] at capacity  Haaren at [address] is at [facility] [place] at capacity  Pt accepted to [facility] by Dr Nancy Harrison. CONSULTS:  IP CONSULT TO NEUROLOGY    PROCEDURES:  Unless otherwise notedbelow, none     Procedures    FINAL IMPRESSION     1. Cerebrovascular accident (CVA), unspecified mechanism ([facility] [address]    2. Dissection of thoracic aorta [facility])          DISPOSITION/PLAN   DISPOSITION Decision To Transfer 10/25/2021 08:30:27 PM      PATIENT REFERRED TO:  @FUP@    DISCHARGE MEDICATIONS:  New Prescriptions    No medications on file         CRITICAL CARE TIME   Total Critical Care time was 45 minutes, excluding separately reportable procedures. Time includes direct patient care, reassessing patient, documenting care, and coordinating care for the patient. Time also includes data interpretation of any lab tests or imaging that were performed. There was a high probability of clinically significant/life threatening deterioration in the patient's condition which required my urgent intervention.       (Please note that portions of this note were completed with a voice recognition program.  Efforts were made to edit the dictations butoccasionally words are mis-transcribed.)    Guanaco Rodriguez MD (electronically signed)  AttendingEmergency Physician         Guanaco Rodriguez MD  10/25/21 1670

## 2021-10-26 NOTE — PROGRESS NOTES
Pt arrived to [facility] 1 sleepy, no c/o pain, BP remained at pre-op level. Wife present in room. Pt given time to sleep and wake up prior to working with PT. Vinod Magana called around [phone] and was asked to come work with the pt around 1400. Vinod Magana worked with the patient and sat him in a wheelchair afterwards. Vinod Magana reported the pt did well walking but became nauseous. Upon going into the room to assess the pt, he was seated in the wheelchair and he asked for an emesis bag. The pt also reported being dizzy with blurred vision and burning eyes. The eyes were assessed. Right eye was twitching slightly. Pt then vomited clear fluids. Pt was given alcohol swabs and damp washcloths. Wife remained at bedside. Zofran was given at 1449. Pt was further assessed. He was asked to stick his tongue out and move it side to side. Eyes and pupils assessed. Pt asked to read the nurse's name ronal. He was then asked to hold his arms out straight. Arm strength was assessed. No deficits noted. The patient reported that the nausea and blurred vision resolved following vomiting. He then stood up from the wheelchair and got back in bed with standby asst x1. He rested there until the pt's wife came to find the RN around 1550. She stated the pt was feeling much better and wanted to go home. Pt was assessed again. His eyes, arms, strength, mobility, and speech were assessed. No deficits noted. The patient moved to a wheelchair with standby asst and was wheeled out to the truck driven by his wife. While waiting for the wife to get the vehicle, the patient and I had a detailed conversation about where we lived and our work. He then got into the truck without difficulty.

## 2021-10-26 NOTE — ED NOTES
Pt had episode of mental status change and BP drop; MD called to bedside; 2nd IV started, RT called       Franci Jamison RN  10/25/21 9434

## 2021-10-26 NOTE — ED NOTES
[facility] accepted flight and will be at [facility] in one hour 45 minutes. Dr. Nancy Diehl aware.        Brandi Desir, ANGELICA  10/25/21 6876

## 2021-10-26 NOTE — ED NOTES
2312-report to Torsten Pappas RN on the flight crew for [employer] via telephone-all questions answered-ETA 2 hours  2313-[facility]-all questions answered-     Roger Juarez RN  10/25/21 2609

## 2021-10-26 NOTE — ED NOTES
PT intubated at this time; 7.5 tube 23 at the [id number] [place] [address], RN  10/25/21 7243

## 2021-10-26 NOTE — ED NOTES
Propofol drip started at 2355 per verbal order from MD Segundo Maria; started at 10 mg/kg per hr       Janie Gardiner RN  10/26/21 9184

## 2021-10-26 NOTE — ED NOTES
[facility] called at 2032. Spoke with Fiona Helton. Declined by , at capacity     [facility] called at 2038.  108 out of [place] accepted at 2042. Put on stand by.  2132 [employer] called back to cancel flight. Call back if we need then=pee [place] 1334 called at 2055 spoke with Donny Neelys. Declined at 2117 Patient is to complex for them . [facility] transfer center called at 2126. Declined due to capacity at 2127. [employer] called at 2131 spoke with Anca. Declined due to capacity. [facility]. [facility] trauma transfer center called at 2139. Spoke with jovon. Awaiting call from . Return call at [phone]. Declined dueto capacity. [facility] at 2157    Britt Treviño declined at 2124    [facility] due to capacity     [facility]   Declined due to capacity     [facility]  [facility]   Declined due to capacity. [address] due to capacity.                      Valentina Prieto  10/25/21 8399

## 2021-10-26 NOTE — ED NOTES
Kalpana placed in pt then this rn called to phone to give report for pt transfer-when finished noticed pt, bp was significantlylower-checked on pt and bp was lower still-then rechecked bp and was 95/54-paged dr Enzo Prasad t room-manual bp 78/44-pt mental status drastically changed-was answering questions when I came into the room but now not really responding-pt intubated and taken to ct     Stephane Wilson, RN  10/26/21 0036

## 2021-10-26 NOTE — ED NOTES
[address] [facility] at 2220 for Dr Janneth Jones. They accepted patient. Accepting doctor is Dr. Truman Gutierrez. Nurse to call report to [phone]. Fax facesheet to [phone]. Hospital information is address:  [address] [place] [address], [address].   Phone number for transfer center is [phone]  10/25/21 9924

## 2021-10-26 NOTE — ED NOTES
Notified by EMS at   Code stroke by [facility]  Dr. Amanda Costa personally called Dianna Gallon called at 2006, return call at 2022  To Ct at 2029         Nuha Hollingsworth  10/25/21 4339

## 2021-10-26 NOTE — ED NOTES
Contacted [facility] to transport patient to [address] [place] [address] of [address] [facility]  10/25/21 1655

## 2021-10-26 NOTE — ED NOTES
Pt sedated on ventilator-family at bedside-[facility] preparing pt for transfer-family going to drive to [place]-flight team given wife Sable Gilford and son suzie's cell phone numbers in case they need to speak to them-     James Noel RN  10/26/21 0929

## 2021-10-27 ENCOUNTER — TELEPHONE (OUTPATIENT)
Dept: INPATIENT UNIT | Age: 61
End: 2021-10-27

## (undated) DEVICE — 6.3MM ROUND FAST CUTTING BUR

## (undated) DEVICE — PIN FIX L229MM DIA4.8MM S STL STYL 6 DMND 1 END THRD STNMN
Type: IMPLANTABLE DEVICE | Site: HIP | Status: NON-FUNCTIONAL
Removed: 2021-10-25

## (undated) DEVICE — GLOVE SURG SZ 85 L12IN FNGR THK79MIL GRN LTX FREE

## (undated) DEVICE — NON-WOVEN ADHESIVE WOUND DRESSING: Brand: PRIMAPORE ADHESIVE DRESSING 30*10CM

## (undated) DEVICE — ABDOMINAL PAD: Brand: DERMACEA

## (undated) DEVICE — COVER POS PERINL POST NS 082501

## (undated) DEVICE — STERILE POLYISOPRENE POWDER-FREE SURGICAL GLOVES: Brand: PROTEXIS

## (undated) DEVICE — MEDI-VAC NON-CONDUCTIVE SUCTION TUBING 6MM X 6.1M (20 FT.) L: Brand: CARDINAL HEALTH

## (undated) DEVICE — DUAL CUT SAGITTAL BLADE

## (undated) DEVICE — ZIMMER® STERILE DISPOSABLE TOURNIQUET CUFF WITH PLC, DUAL PORT, SINGLE BLADDER, 34 IN. (86 CM)

## (undated) DEVICE — UNDERGLOVE SURG SZ 8 FNGR THK0.21MIL GRN LTX BEAD CUF

## (undated) DEVICE — SOLUTION IV IRRIG POUR BRL 0.9% SODIUM CHL 2F7124

## (undated) DEVICE — 4.5 MM INCISOR PLUS STRAIGHT                                    BLADES, POWER/EP-1, VIOLET, PACKAGED                                    6 PER BOX, STERILE

## (undated) DEVICE — 3M™ STERI-STRIP™ REINFORCED ADHESIVE SKIN CLOSURES, R1547, 1/2 IN X 4 IN (12 MM X 100 MM), 6 STRIPS/ENVELOPE: Brand: 3M™ STERI-STRIP™

## (undated) DEVICE — SYSTEM SKIN CLSR 22CM DERMBND PRINEO

## (undated) DEVICE — U-DRAPE: Brand: CONVERTORS

## (undated) DEVICE — SUTURE VCRL SZ 2-0 L36IN ABSRB UD L36MM CT-1 1/2 CIR J945H

## (undated) DEVICE — WEREWOLF FASTSEAL 6.0 HEMOSTASIS WAND: Brand: FASTSEAL 6.0 HEMOSTASIS WAND

## (undated) DEVICE — GLOVE ORTHO 7 1/2   MSG9475

## (undated) DEVICE — THREE QUARTER SHEET: Brand: CONVERTORS

## (undated) DEVICE — SUTURE ETHLN SZ 4-0 L18IN NONABSORBABLE BLK L19MM PS-2 3/8 1667H

## (undated) DEVICE — Z DISCONTINUED USE 2272117 DRAPE SURG 3 QTR N INVASIVE 2 LAYR DISP

## (undated) DEVICE — BIPOLAR SEALER 23-112-1 AQM 6.0: Brand: AQUAMANTYS ®

## (undated) DEVICE — LIGHT SUCT UNTETHERED SCINTILLANT

## (undated) DEVICE — Z INACTIVE USE 2660664 SOLUTION IRRIG 3000ML 0.9% SOD CHL USP UROMATIC PLAS CONT

## (undated) DEVICE — GLOVE SURG SZ 75 CRM LTX FREE POLYISOPRENE POLYMER BEAD ANTI

## (undated) DEVICE — FAN SPRAY KIT: Brand: PULSAVAC®

## (undated) DEVICE — SUTURE VCRL SZ 3-0 L27IN ABSRB UD L26MM SH 1/2 CIR J416H

## (undated) DEVICE — CHLORAPREP 26ML ORANGE

## (undated) DEVICE — GLOVE ORTHO 7   MSG9470

## (undated) DEVICE — SUTURE VCRL SZ 0 L27IN ABSRB UD L36MM CT-1 1/2 CIR J260H

## (undated) DEVICE — 3M™ IOBAN™ 2 ANTIMICROBIAL INCISE DRAPE 6650EZ: Brand: IOBAN™ 2

## (undated) DEVICE — DRAPE,ORTHOMAX,ARTHRO T ,W/ POUCH: Brand: MEDLINE

## (undated) DEVICE — Z CONVERTED USE 2271386 BANDAGE COMPR W6INXL11YD WVN COTTON/ELASTIC CLP CLSR DBL

## (undated) DEVICE — TUBING PMP IRRIG GOFLO

## (undated) DEVICE — PACK ANT HIP CDS

## (undated) DEVICE — TOWEL,OR,DSP,ST,BLUE,DLX,4/PK,20PK/CS: Brand: MEDLINE

## (undated) DEVICE — SURGICAL PROCEDURE PACK LOWER EXTREMITY LOURDES HOSP

## (undated) DEVICE — GOWN,PRECEPT,XLNG/XXLARGE,STRL: Brand: MEDLINE

## (undated) DEVICE — SUTURE ABSRB BRAID COAT UD CP NO 2 27IN VCRL J195H

## (undated) DEVICE — 90-S MAX, SUCTION PROBE, NON-BENDABLE, MAX CUT LEVEL 11: Brand: SERFAS ENERGY

## (undated) DEVICE — TOTAL TRAY, 16FR 10ML SIL FOLEY, URN: Brand: MEDLINE

## (undated) DEVICE — GLOVE SURG SZ 85 CRM LTX FREE POLYISOPRENE POLYMER BEAD ANTI

## (undated) DEVICE — SOLUTION IV 250ML 0.9% SOD CHL PH 5 INJ USP VIAFLX PLAS

## (undated) DEVICE — SOLUTION IRRIG 3000ML 0.9% SOD CHL USP UROMATIC PLAS CONT